# Patient Record
Sex: MALE | Race: WHITE | NOT HISPANIC OR LATINO | Employment: FULL TIME | URBAN - METROPOLITAN AREA
[De-identification: names, ages, dates, MRNs, and addresses within clinical notes are randomized per-mention and may not be internally consistent; named-entity substitution may affect disease eponyms.]

---

## 2023-10-24 ENCOUNTER — APPOINTMENT (OUTPATIENT)
Dept: CT IMAGING | Facility: HOSPITAL | Age: 53
DRG: 345 | End: 2023-10-24
Payer: COMMERCIAL

## 2023-10-24 ENCOUNTER — HOSPITAL ENCOUNTER (INPATIENT)
Facility: HOSPITAL | Age: 53
LOS: 1 days | Discharge: HOME | DRG: 345 | End: 2023-10-26
Attending: EMERGENCY MEDICINE | Admitting: INTERNAL MEDICINE
Payer: COMMERCIAL

## 2023-10-24 DIAGNOSIS — K61.1 PERIRECTAL ABSCESS: Primary | ICD-10-CM

## 2023-10-24 DIAGNOSIS — K61.1 PERI-RECTAL ABSCESS: ICD-10-CM

## 2023-10-24 PROBLEM — Z79.01 CHRONIC ANTICOAGULATION: Status: ACTIVE | Noted: 2023-10-24

## 2023-10-24 PROBLEM — E11.65 TYPE 2 DIABETES MELLITUS WITH HYPERGLYCEMIA, WITHOUT LONG-TERM CURRENT USE OF INSULIN: Status: ACTIVE | Noted: 2023-10-24

## 2023-10-24 PROBLEM — E78.2 MIXED HYPERLIPIDEMIA: Status: ACTIVE | Noted: 2023-10-24

## 2023-10-24 PROBLEM — Z95.810 ICD (IMPLANTABLE CARDIOVERTER-DEFIBRILLATOR) IN PLACE: Status: ACTIVE | Noted: 2023-10-24

## 2023-10-24 PROBLEM — I25.10 CORONARY ARTERY DISEASE INVOLVING NATIVE CORONARY ARTERY OF NATIVE HEART WITHOUT ANGINA PECTORIS: Status: ACTIVE | Noted: 2023-10-24

## 2023-10-24 LAB
ALBUMIN SERPL-MCNC: 4.8 G/DL (ref 3.5–5.2)
ALBUMIN/GLOB SERPL: 1.5 G/DL
ALP SERPL-CCNC: 109 U/L (ref 39–117)
ALT SERPL W P-5'-P-CCNC: 21 U/L (ref 1–41)
ANION GAP SERPL CALCULATED.3IONS-SCNC: 11 MMOL/L (ref 5–15)
AST SERPL-CCNC: 17 U/L (ref 1–40)
BASOPHILS # BLD AUTO: 0.05 10*3/MM3 (ref 0–0.2)
BASOPHILS NFR BLD AUTO: 0.7 % (ref 0–1.5)
BILIRUB SERPL-MCNC: 0.6 MG/DL (ref 0–1.2)
BILIRUB UR QL STRIP: NEGATIVE
BUN SERPL-MCNC: 16 MG/DL (ref 6–20)
BUN/CREAT SERPL: 12.3 (ref 7–25)
CALCIUM SPEC-SCNC: 10.1 MG/DL (ref 8.6–10.5)
CHLORIDE SERPL-SCNC: 100 MMOL/L (ref 98–107)
CLARITY UR: CLEAR
CO2 SERPL-SCNC: 29 MMOL/L (ref 22–29)
COLOR UR: YELLOW
CREAT SERPL-MCNC: 1.3 MG/DL (ref 0.76–1.27)
D-LACTATE SERPL-SCNC: 1.2 MMOL/L (ref 0.5–2)
DEPRECATED RDW RBC AUTO: 37.5 FL (ref 37–54)
EGFRCR SERPLBLD CKD-EPI 2021: 64.9 ML/MIN/1.73
EOSINOPHIL # BLD AUTO: 0.16 10*3/MM3 (ref 0–0.4)
EOSINOPHIL NFR BLD AUTO: 2.1 % (ref 0.3–6.2)
ERYTHROCYTE [DISTWIDTH] IN BLOOD BY AUTOMATED COUNT: 11.9 % (ref 12.3–15.4)
GLOBULIN UR ELPH-MCNC: 3.3 GM/DL
GLUCOSE SERPL-MCNC: 152 MG/DL (ref 65–99)
GLUCOSE UR STRIP-MCNC: ABNORMAL MG/DL
HCT VFR BLD AUTO: 52.1 % (ref 37.5–51)
HGB BLD-MCNC: 17.8 G/DL (ref 13–17.7)
HGB UR QL STRIP.AUTO: NEGATIVE
IMM GRANULOCYTES # BLD AUTO: 0.02 10*3/MM3 (ref 0–0.05)
IMM GRANULOCYTES NFR BLD AUTO: 0.3 % (ref 0–0.5)
KETONES UR QL STRIP: NEGATIVE
LEUKOCYTE ESTERASE UR QL STRIP.AUTO: NEGATIVE
LYMPHOCYTES # BLD AUTO: 1.61 10*3/MM3 (ref 0.7–3.1)
LYMPHOCYTES NFR BLD AUTO: 21.6 % (ref 19.6–45.3)
MCH RBC QN AUTO: 29.3 PG (ref 26.6–33)
MCHC RBC AUTO-ENTMCNC: 34.2 G/DL (ref 31.5–35.7)
MCV RBC AUTO: 85.8 FL (ref 79–97)
MONOCYTES # BLD AUTO: 0.87 10*3/MM3 (ref 0.1–0.9)
MONOCYTES NFR BLD AUTO: 11.7 % (ref 5–12)
NEUTROPHILS NFR BLD AUTO: 4.74 10*3/MM3 (ref 1.7–7)
NEUTROPHILS NFR BLD AUTO: 63.6 % (ref 42.7–76)
NITRITE UR QL STRIP: NEGATIVE
NRBC BLD AUTO-RTO: 0 /100 WBC (ref 0–0.2)
PH UR STRIP.AUTO: 6 [PH] (ref 5–8)
PLATELET # BLD AUTO: 274 10*3/MM3 (ref 140–450)
PMV BLD AUTO: 9.1 FL (ref 6–12)
POTASSIUM SERPL-SCNC: 4.1 MMOL/L (ref 3.5–5.2)
PROT SERPL-MCNC: 8.1 G/DL (ref 6–8.5)
PROT UR QL STRIP: ABNORMAL
RBC # BLD AUTO: 6.07 10*6/MM3 (ref 4.14–5.8)
SODIUM SERPL-SCNC: 140 MMOL/L (ref 136–145)
SP GR UR STRIP: 1.04 (ref 1–1.03)
UROBILINOGEN UR QL STRIP: ABNORMAL
WBC NRBC COR # BLD: 7.45 10*3/MM3 (ref 3.4–10.8)

## 2023-10-24 PROCEDURE — 87040 BLOOD CULTURE FOR BACTERIA: CPT | Performed by: EMERGENCY MEDICINE

## 2023-10-24 PROCEDURE — 80053 COMPREHEN METABOLIC PANEL: CPT | Performed by: EMERGENCY MEDICINE

## 2023-10-24 PROCEDURE — 25810000003 SODIUM CHLORIDE 0.9 % SOLUTION: Performed by: PHYSICIAN ASSISTANT

## 2023-10-24 PROCEDURE — 74176 CT ABD & PELVIS W/O CONTRAST: CPT

## 2023-10-24 PROCEDURE — 83605 ASSAY OF LACTIC ACID: CPT | Performed by: EMERGENCY MEDICINE

## 2023-10-24 PROCEDURE — 25010000002 PIPERACILLIN SOD-TAZOBACTAM PER 1 G: Performed by: EMERGENCY MEDICINE

## 2023-10-24 PROCEDURE — G0378 HOSPITAL OBSERVATION PER HR: HCPCS

## 2023-10-24 PROCEDURE — 36415 COLL VENOUS BLD VENIPUNCTURE: CPT

## 2023-10-24 PROCEDURE — 81003 URINALYSIS AUTO W/O SCOPE: CPT | Performed by: EMERGENCY MEDICINE

## 2023-10-24 PROCEDURE — 99285 EMERGENCY DEPT VISIT HI MDM: CPT

## 2023-10-24 PROCEDURE — 85025 COMPLETE CBC W/AUTO DIFF WBC: CPT | Performed by: EMERGENCY MEDICINE

## 2023-10-24 RX ORDER — NICOTINE POLACRILEX 4 MG
15 LOZENGE BUCCAL
Status: DISCONTINUED | OUTPATIENT
Start: 2023-10-24 | End: 2023-10-26 | Stop reason: HOSPADM

## 2023-10-24 RX ORDER — POLYETHYLENE GLYCOL 3350 17 G/17G
17 POWDER, FOR SOLUTION ORAL DAILY PRN
Status: DISCONTINUED | OUTPATIENT
Start: 2023-10-24 | End: 2023-10-26 | Stop reason: HOSPADM

## 2023-10-24 RX ORDER — METOPROLOL SUCCINATE 25 MG/1
25 TABLET, EXTENDED RELEASE ORAL EVERY 24 HOURS
COMMUNITY
Start: 2023-09-13

## 2023-10-24 RX ORDER — EZETIMIBE 10 MG/1
10 TABLET ORAL DAILY
COMMUNITY
Start: 2023-09-13

## 2023-10-24 RX ORDER — ONDANSETRON 4 MG/1
4 TABLET, FILM COATED ORAL EVERY 6 HOURS PRN
Status: DISCONTINUED | OUTPATIENT
Start: 2023-10-24 | End: 2023-10-26 | Stop reason: HOSPADM

## 2023-10-24 RX ORDER — BUSPIRONE HYDROCHLORIDE 15 MG/1
15 TABLET ORAL 2 TIMES DAILY
COMMUNITY
Start: 2023-10-09

## 2023-10-24 RX ORDER — IBUPROFEN 600 MG/1
1 TABLET ORAL
Status: DISCONTINUED | OUTPATIENT
Start: 2023-10-24 | End: 2023-10-26 | Stop reason: HOSPADM

## 2023-10-24 RX ORDER — SULFAMETHOXAZOLE AND TRIMETHOPRIM 800; 160 MG/1; MG/1
1 TABLET ORAL EVERY 12 HOURS SCHEDULED
COMMUNITY
Start: 2023-10-23 | End: 2023-10-26 | Stop reason: HOSPADM

## 2023-10-24 RX ORDER — ONDANSETRON 2 MG/ML
4 INJECTION INTRAMUSCULAR; INTRAVENOUS EVERY 6 HOURS PRN
Status: DISCONTINUED | OUTPATIENT
Start: 2023-10-24 | End: 2023-10-26 | Stop reason: HOSPADM

## 2023-10-24 RX ORDER — HYDROCODONE BITARTRATE AND ACETAMINOPHEN 5; 325 MG/1; MG/1
1 TABLET ORAL EVERY 4 HOURS PRN
Status: DISCONTINUED | OUTPATIENT
Start: 2023-10-24 | End: 2023-10-25 | Stop reason: ALTCHOICE

## 2023-10-24 RX ORDER — INSULIN LISPRO 100 [IU]/ML
2-9 INJECTION, SOLUTION INTRAVENOUS; SUBCUTANEOUS
Status: DISCONTINUED | OUTPATIENT
Start: 2023-10-25 | End: 2023-10-26 | Stop reason: HOSPADM

## 2023-10-24 RX ORDER — METOPROLOL SUCCINATE 25 MG/1
25 TABLET, EXTENDED RELEASE ORAL EVERY 24 HOURS
Status: DISCONTINUED | OUTPATIENT
Start: 2023-10-25 | End: 2023-10-26 | Stop reason: HOSPADM

## 2023-10-24 RX ORDER — ACETAMINOPHEN 325 MG/1
650 TABLET ORAL EVERY 4 HOURS PRN
Status: DISCONTINUED | OUTPATIENT
Start: 2023-10-24 | End: 2023-10-26 | Stop reason: HOSPADM

## 2023-10-24 RX ORDER — SODIUM CHLORIDE 0.9 % (FLUSH) 0.9 %
10 SYRINGE (ML) INJECTION AS NEEDED
Status: DISCONTINUED | OUTPATIENT
Start: 2023-10-24 | End: 2023-10-26 | Stop reason: HOSPADM

## 2023-10-24 RX ORDER — CHOLECALCIFEROL (VITAMIN D3) 125 MCG
5 CAPSULE ORAL NIGHTLY PRN
Status: DISCONTINUED | OUTPATIENT
Start: 2023-10-24 | End: 2023-10-26 | Stop reason: HOSPADM

## 2023-10-24 RX ORDER — ATORVASTATIN CALCIUM 40 MG/1
80 TABLET, FILM COATED ORAL DAILY
Status: DISCONTINUED | OUTPATIENT
Start: 2023-10-25 | End: 2023-10-26 | Stop reason: HOSPADM

## 2023-10-24 RX ORDER — PANTOPRAZOLE SODIUM 40 MG/1
40 TABLET, DELAYED RELEASE ORAL DAILY
Status: DISCONTINUED | OUTPATIENT
Start: 2023-10-25 | End: 2023-10-26 | Stop reason: HOSPADM

## 2023-10-24 RX ORDER — OXYCODONE AND ACETAMINOPHEN 7.5; 325 MG/1; MG/1
1 TABLET ORAL ONCE
Status: COMPLETED | OUTPATIENT
Start: 2023-10-24 | End: 2023-10-24

## 2023-10-24 RX ORDER — FLUOXETINE HYDROCHLORIDE 20 MG/1
20 CAPSULE ORAL DAILY
Status: DISCONTINUED | OUTPATIENT
Start: 2023-10-25 | End: 2023-10-26 | Stop reason: HOSPADM

## 2023-10-24 RX ORDER — FLUTICASONE PROPIONATE AND SALMETEROL 250; 50 UG/1; UG/1
1 POWDER RESPIRATORY (INHALATION)
COMMUNITY

## 2023-10-24 RX ORDER — BISACODYL 5 MG/1
5 TABLET, DELAYED RELEASE ORAL DAILY PRN
Status: DISCONTINUED | OUTPATIENT
Start: 2023-10-24 | End: 2023-10-26 | Stop reason: HOSPADM

## 2023-10-24 RX ORDER — FLUOXETINE HYDROCHLORIDE 20 MG/1
20 CAPSULE ORAL DAILY
COMMUNITY
Start: 2023-09-13

## 2023-10-24 RX ORDER — ALBUTEROL SULFATE 90 UG/1
1 AEROSOL, METERED RESPIRATORY (INHALATION) SEE ADMIN INSTRUCTIONS
COMMUNITY
Start: 2023-08-09

## 2023-10-24 RX ORDER — METFORMIN HYDROCHLORIDE 750 MG/1
750 TABLET, EXTENDED RELEASE ORAL NIGHTLY
COMMUNITY
Start: 2023-08-14

## 2023-10-24 RX ORDER — SACUBITRIL AND VALSARTAN 97; 103 MG/1; MG/1
1 TABLET, FILM COATED ORAL
COMMUNITY
Start: 2023-10-09

## 2023-10-24 RX ORDER — SPIRONOLACTONE 25 MG/1
25 TABLET ORAL DAILY
Status: DISCONTINUED | OUTPATIENT
Start: 2023-10-25 | End: 2023-10-26 | Stop reason: HOSPADM

## 2023-10-24 RX ORDER — ATORVASTATIN CALCIUM 80 MG/1
80 TABLET, FILM COATED ORAL DAILY
COMMUNITY
Start: 2023-09-13

## 2023-10-24 RX ORDER — AMOXICILLIN 250 MG
2 CAPSULE ORAL 2 TIMES DAILY
Status: DISCONTINUED | OUTPATIENT
Start: 2023-10-25 | End: 2023-10-26 | Stop reason: HOSPADM

## 2023-10-24 RX ORDER — BISACODYL 10 MG
10 SUPPOSITORY, RECTAL RECTAL DAILY PRN
Status: DISCONTINUED | OUTPATIENT
Start: 2023-10-24 | End: 2023-10-26 | Stop reason: HOSPADM

## 2023-10-24 RX ORDER — SODIUM CHLORIDE 0.9 % (FLUSH) 0.9 %
10 SYRINGE (ML) INJECTION EVERY 12 HOURS SCHEDULED
Status: DISCONTINUED | OUTPATIENT
Start: 2023-10-24 | End: 2023-10-26 | Stop reason: HOSPADM

## 2023-10-24 RX ORDER — PANTOPRAZOLE SODIUM 40 MG/1
40 TABLET, DELAYED RELEASE ORAL DAILY
COMMUNITY
Start: 2022-11-22 | End: 2023-11-22

## 2023-10-24 RX ORDER — SPIRONOLACTONE 25 MG/1
25 TABLET ORAL DAILY
COMMUNITY
Start: 2023-09-08 | End: 2024-09-07

## 2023-10-24 RX ORDER — APIXABAN 5 MG/1
5 TABLET, FILM COATED ORAL 2 TIMES DAILY
Status: ON HOLD | COMMUNITY
Start: 2023-09-13 | End: 2023-10-26 | Stop reason: SDUPTHER

## 2023-10-24 RX ORDER — SODIUM CHLORIDE 9 MG/ML
40 INJECTION, SOLUTION INTRAVENOUS AS NEEDED
Status: DISCONTINUED | OUTPATIENT
Start: 2023-10-24 | End: 2023-10-26 | Stop reason: HOSPADM

## 2023-10-24 RX ORDER — DEXTROSE MONOHYDRATE 25 G/50ML
25 INJECTION, SOLUTION INTRAVENOUS
Status: DISCONTINUED | OUTPATIENT
Start: 2023-10-24 | End: 2023-10-26 | Stop reason: HOSPADM

## 2023-10-24 RX ORDER — SODIUM CHLORIDE 9 MG/ML
100 INJECTION, SOLUTION INTRAVENOUS CONTINUOUS
Status: DISCONTINUED | OUTPATIENT
Start: 2023-10-24 | End: 2023-10-25

## 2023-10-24 RX ADMIN — OXYCODONE HYDROCHLORIDE AND ACETAMINOPHEN 1 TABLET: 7.5; 325 TABLET ORAL at 22:39

## 2023-10-24 RX ADMIN — PIPERACILLIN SODIUM AND TAZOBACTAM SODIUM 3.38 G: 3; .375 INJECTION, SOLUTION INTRAVENOUS at 22:39

## 2023-10-24 RX ADMIN — Medication 10 ML: at 23:39

## 2023-10-24 RX ADMIN — SODIUM CHLORIDE 100 ML/HR: 9 INJECTION, SOLUTION INTRAVENOUS at 23:39

## 2023-10-24 NOTE — Clinical Note
Level of Care: Telemetry [5]   Diagnosis: Perirectal abscess [[id number]]   Admitting Physician: FRANNY KANG [[id number]]   Attending Physician: FRANNY KANG [[id number]]

## 2023-10-24 NOTE — ED PROVIDER NOTES
Subjective   History of Present Illness  Mr. Santoyo presents with rectal pain and urinary retention.  He reports a week ago he had a painful bowel movement and noted swelling and felt that he probably had hemorrhoids.  It continued.  He saw his primary care provider recently and was prescribed Bactrim for prostatitis.  He was found to be heme positive as well.  He notes swelling around his anus and increasing pain.  He tells me he has to take a hot shower in order to pass urine.  He called his primary care provider today and they told him to come to the emergency department and recommended CAT scanning.       He has history of cardiomyopathy and has an AICD.  He has coronary artery disease and has had multiple stents.      Review of Systems    Past Medical History:   Diagnosis Date    Asthma     Diabetes mellitus     Hypertension     ICD (implantable cardioverter-defibrillator), dual, in situ     MI (myocardial infarction)     Stenosis involving cardiac device     V-tach        No Known Allergies    Past Surgical History:   Procedure Laterality Date    CORONARY STENT PLACEMENT      Stens    ICD GENERATOR REPLACEMENT         Family History   Problem Relation Age of Onset    No Known Problems Mother     No Known Problems Father        Social History     Socioeconomic History    Marital status:    Tobacco Use    Smoking status: Former     Types: Cigarettes    Smokeless tobacco: Never   Vaping Use    Vaping Use: Never used   Substance and Sexual Activity    Alcohol use: Yes    Drug use: Never           Objective   Physical Exam  Vitals and nursing note reviewed.   Constitutional:       General: He is not in acute distress.     Appearance: Normal appearance.   HENT:      Head: Normocephalic and atraumatic.      Nose: Nose normal. No congestion or rhinorrhea.   Eyes:      General: No scleral icterus.     Conjunctiva/sclera: Conjunctivae normal.   Neck:      Comments: No JVD   Cardiovascular:      Rate and Rhythm:  Normal rate and regular rhythm.      Heart sounds: No murmur heard.     No friction rub.   Pulmonary:      Effort: Pulmonary effort is normal.      Breath sounds: Normal breath sounds. No wheezing or rales.   Abdominal:      General: Bowel sounds are normal.      Palpations: Abdomen is soft.      Tenderness: There is no abdominal tenderness. There is no guarding or rebound.   Genitourinary:     Rectum: Normal.      Comments: There is no visible redness or pointing around the anus or perineum.  He does have tenderness along the left side and there is a small palpable firm area several centimeters deep.  There is no palpable abscess however.  Musculoskeletal:         General: No tenderness.      Cervical back: Normal range of motion and neck supple.      Right lower leg: No edema.      Left lower leg: No edema.   Skin:     General: Skin is warm and dry.      Coloration: Skin is not pale.      Findings: No erythema.   Neurological:      General: No focal deficit present.      Mental Status: He is alert and oriented to person, place, and time.      Motor: No weakness.      Coordination: Coordination normal.   Psychiatric:         Mood and Affect: Mood normal.         Behavior: Behavior normal.         Thought Content: Thought content normal.         Procedures           ED Course  ED Course as of 10/24/23 2328   Tue Oct 24, 2023   2149 I reviewed his CT scan.  I examined his rectum.  He is tender but there is no pointing or obvious abscess which I can drain from the outside.  He is a diabetic, this likely will require surgery or at least a procedure of some kind.  Going to start IV antibiotics and get him admitted to the hospital. [DT]   2150 Paged the colorectal surgeon to discuss. [DT]   2155 Spoke with Dr. Irving.  He agrees with IV antibiotics.  Advises this may or may not require surgery.  We will admit him to the hospital and he will see him in the morning and reassess at that time. [DT]      ED Course User  Index  [DT] Ronnie Nur MD                                           Medical Decision Making  Please see course notes.  I ordered and interpreted labs.  I ordered and interpreted results of CT scan showing perirectal abscess.  I had consultation with colorectal surgery.  I made arrangements for admission, gave IV antibiotics and pain medication.    Problems Addressed:  Perirectal abscess: complicated acute illness or injury    Amount and/or Complexity of Data Reviewed  Labs: ordered. Decision-making details documented in ED Course.  Radiology: ordered. Decision-making details documented in ED Course.    Risk  Prescription drug management.  Decision regarding hospitalization.        Final diagnoses:   Perirectal abscess       ED Disposition  ED Disposition       ED Disposition   Decision to Admit    Condition   --    Comment   Level of Care: Telemetry [5]   Diagnosis: Perirectal abscess [[id number]]   Admitting Physician: FRANNY KANG [[id number]]   Attending Physician: FRANNY KANG [[id number]]                 No follow-up provider specified.       Medication List      No changes were made to your prescriptions during this visit.            Ronnie Nur MD  10/24/23 9357

## 2023-10-25 ENCOUNTER — ANESTHESIA (OUTPATIENT)
Dept: PERIOP | Facility: HOSPITAL | Age: 53
End: 2023-10-25
Payer: COMMERCIAL

## 2023-10-25 ENCOUNTER — ANESTHESIA EVENT (OUTPATIENT)
Dept: PERIOP | Facility: HOSPITAL | Age: 53
End: 2023-10-25
Payer: COMMERCIAL

## 2023-10-25 LAB
ANION GAP SERPL CALCULATED.3IONS-SCNC: 10 MMOL/L (ref 5–15)
BASOPHILS # BLD AUTO: 0.05 10*3/MM3 (ref 0–0.2)
BASOPHILS NFR BLD AUTO: 0.8 % (ref 0–1.5)
BUN SERPL-MCNC: 17 MG/DL (ref 6–20)
BUN/CREAT SERPL: 15.3 (ref 7–25)
CALCIUM SPEC-SCNC: 9.3 MG/DL (ref 8.6–10.5)
CHLORIDE SERPL-SCNC: 100 MMOL/L (ref 98–107)
CO2 SERPL-SCNC: 27 MMOL/L (ref 22–29)
CREAT SERPL-MCNC: 1.11 MG/DL (ref 0.76–1.27)
DEPRECATED RDW RBC AUTO: 37.2 FL (ref 37–54)
EGFRCR SERPLBLD CKD-EPI 2021: 78.4 ML/MIN/1.73
EOSINOPHIL # BLD AUTO: 0.14 10*3/MM3 (ref 0–0.4)
EOSINOPHIL NFR BLD AUTO: 2.1 % (ref 0.3–6.2)
ERYTHROCYTE [DISTWIDTH] IN BLOOD BY AUTOMATED COUNT: 11.9 % (ref 12.3–15.4)
GLUCOSE BLDC GLUCOMTR-MCNC: 118 MG/DL (ref 70–130)
GLUCOSE BLDC GLUCOMTR-MCNC: 120 MG/DL (ref 70–130)
GLUCOSE BLDC GLUCOMTR-MCNC: 142 MG/DL (ref 70–130)
GLUCOSE BLDC GLUCOMTR-MCNC: 94 MG/DL (ref 70–130)
GLUCOSE SERPL-MCNC: 117 MG/DL (ref 65–99)
HBA1C MFR BLD: 7.2 % (ref 4.8–5.6)
HCT VFR BLD AUTO: 46.8 % (ref 37.5–51)
HGB BLD-MCNC: 16 G/DL (ref 13–17.7)
IMM GRANULOCYTES # BLD AUTO: 0.02 10*3/MM3 (ref 0–0.05)
IMM GRANULOCYTES NFR BLD AUTO: 0.3 % (ref 0–0.5)
LYMPHOCYTES # BLD AUTO: 1.59 10*3/MM3 (ref 0.7–3.1)
LYMPHOCYTES NFR BLD AUTO: 23.9 % (ref 19.6–45.3)
MCH RBC QN AUTO: 29.2 PG (ref 26.6–33)
MCHC RBC AUTO-ENTMCNC: 34.2 G/DL (ref 31.5–35.7)
MCV RBC AUTO: 85.4 FL (ref 79–97)
MONOCYTES # BLD AUTO: 0.84 10*3/MM3 (ref 0.1–0.9)
MONOCYTES NFR BLD AUTO: 12.6 % (ref 5–12)
NEUTROPHILS NFR BLD AUTO: 4.02 10*3/MM3 (ref 1.7–7)
NEUTROPHILS NFR BLD AUTO: 60.3 % (ref 42.7–76)
NRBC BLD AUTO-RTO: 0 /100 WBC (ref 0–0.2)
PLATELET # BLD AUTO: 222 10*3/MM3 (ref 140–450)
PMV BLD AUTO: 9.3 FL (ref 6–12)
POTASSIUM SERPL-SCNC: 3.8 MMOL/L (ref 3.5–5.2)
RBC # BLD AUTO: 5.48 10*6/MM3 (ref 4.14–5.8)
SODIUM SERPL-SCNC: 137 MMOL/L (ref 136–145)
WBC NRBC COR # BLD: 6.66 10*3/MM3 (ref 3.4–10.8)

## 2023-10-25 PROCEDURE — 0D9P7ZZ DRAINAGE OF RECTUM, VIA NATURAL OR ARTIFICIAL OPENING: ICD-10-PCS | Performed by: STUDENT IN AN ORGANIZED HEALTH CARE EDUCATION/TRAINING PROGRAM

## 2023-10-25 PROCEDURE — 25010000002 PIPERACILLIN SOD-TAZOBACTAM PER 1 G

## 2023-10-25 PROCEDURE — 25010000002 ENOXAPARIN PER 10 MG: Performed by: STUDENT IN AN ORGANIZED HEALTH CARE EDUCATION/TRAINING PROGRAM

## 2023-10-25 PROCEDURE — 80048 BASIC METABOLIC PNL TOTAL CA: CPT | Performed by: PHYSICIAN ASSISTANT

## 2023-10-25 PROCEDURE — 0DJD8ZZ INSPECTION OF LOWER INTESTINAL TRACT, VIA NATURAL OR ARTIFICIAL OPENING ENDOSCOPIC: ICD-10-PCS | Performed by: STUDENT IN AN ORGANIZED HEALTH CARE EDUCATION/TRAINING PROGRAM

## 2023-10-25 PROCEDURE — 25010000002 MIDAZOLAM PER 1 MG: Performed by: ANESTHESIOLOGY

## 2023-10-25 PROCEDURE — 87076 CULTURE ANAEROBE IDENT EACH: CPT | Performed by: STUDENT IN AN ORGANIZED HEALTH CARE EDUCATION/TRAINING PROGRAM

## 2023-10-25 PROCEDURE — 25810000003 LACTATED RINGERS PER 1000 ML: Performed by: ANESTHESIOLOGY

## 2023-10-25 PROCEDURE — 87186 SC STD MICRODIL/AGAR DIL: CPT | Performed by: STUDENT IN AN ORGANIZED HEALTH CARE EDUCATION/TRAINING PROGRAM

## 2023-10-25 PROCEDURE — 87075 CULTR BACTERIA EXCEPT BLOOD: CPT | Performed by: STUDENT IN AN ORGANIZED HEALTH CARE EDUCATION/TRAINING PROGRAM

## 2023-10-25 PROCEDURE — 25010000002 FENTANYL CITRATE (PF) 50 MCG/ML SOLUTION: Performed by: ANESTHESIOLOGY

## 2023-10-25 PROCEDURE — 82948 REAGENT STRIP/BLOOD GLUCOSE: CPT

## 2023-10-25 PROCEDURE — 83036 HEMOGLOBIN GLYCOSYLATED A1C: CPT | Performed by: PHYSICIAN ASSISTANT

## 2023-10-25 PROCEDURE — 25010000002 ONDANSETRON PER 1 MG: Performed by: STUDENT IN AN ORGANIZED HEALTH CARE EDUCATION/TRAINING PROGRAM

## 2023-10-25 PROCEDURE — 85025 COMPLETE CBC W/AUTO DIFF WBC: CPT | Performed by: PHYSICIAN ASSISTANT

## 2023-10-25 PROCEDURE — 25010000002 ONDANSETRON PER 1 MG: Performed by: ANESTHESIOLOGY

## 2023-10-25 PROCEDURE — 87077 CULTURE AEROBIC IDENTIFY: CPT | Performed by: STUDENT IN AN ORGANIZED HEALTH CARE EDUCATION/TRAINING PROGRAM

## 2023-10-25 PROCEDURE — 87205 SMEAR GRAM STAIN: CPT | Performed by: STUDENT IN AN ORGANIZED HEALTH CARE EDUCATION/TRAINING PROGRAM

## 2023-10-25 PROCEDURE — 25010000002 PIPERACILLIN SOD-TAZOBACTAM PER 1 G: Performed by: STUDENT IN AN ORGANIZED HEALTH CARE EDUCATION/TRAINING PROGRAM

## 2023-10-25 PROCEDURE — 87070 CULTURE OTHR SPECIMN AEROBIC: CPT | Performed by: STUDENT IN AN ORGANIZED HEALTH CARE EDUCATION/TRAINING PROGRAM

## 2023-10-25 PROCEDURE — 87015 SPECIMEN INFECT AGNT CONCNTJ: CPT | Performed by: STUDENT IN AN ORGANIZED HEALTH CARE EDUCATION/TRAINING PROGRAM

## 2023-10-25 PROCEDURE — 25010000002 HYDROMORPHONE PER 4 MG: Performed by: STUDENT IN AN ORGANIZED HEALTH CARE EDUCATION/TRAINING PROGRAM

## 2023-10-25 PROCEDURE — 25010000002 PROPOFOL 10 MG/ML EMULSION: Performed by: ANESTHESIOLOGY

## 2023-10-25 RX ORDER — FENTANYL CITRATE 50 UG/ML
50 INJECTION, SOLUTION INTRAMUSCULAR; INTRAVENOUS
Status: DISCONTINUED | OUTPATIENT
Start: 2023-10-25 | End: 2023-10-25

## 2023-10-25 RX ORDER — OXYCODONE HYDROCHLORIDE 5 MG/1
5 TABLET ORAL EVERY 6 HOURS PRN
Qty: 28 TABLET | Refills: 0 | Status: SHIPPED | OUTPATIENT
Start: 2023-10-25 | End: 2023-11-02

## 2023-10-25 RX ORDER — FAMOTIDINE 20 MG/1
20 TABLET, FILM COATED ORAL ONCE
Status: DISCONTINUED | OUTPATIENT
Start: 2023-10-25 | End: 2023-10-25

## 2023-10-25 RX ORDER — MIDAZOLAM HYDROCHLORIDE 1 MG/ML
1 INJECTION INTRAMUSCULAR; INTRAVENOUS
Status: DISCONTINUED | OUTPATIENT
Start: 2023-10-25 | End: 2023-10-25

## 2023-10-25 RX ORDER — ONDANSETRON 2 MG/ML
INJECTION INTRAMUSCULAR; INTRAVENOUS AS NEEDED
Status: DISCONTINUED | OUTPATIENT
Start: 2023-10-25 | End: 2023-10-25 | Stop reason: SURG

## 2023-10-25 RX ORDER — SODIUM CHLORIDE, SODIUM LACTATE, POTASSIUM CHLORIDE, CALCIUM CHLORIDE 600; 310; 30; 20 MG/100ML; MG/100ML; MG/100ML; MG/100ML
9 INJECTION, SOLUTION INTRAVENOUS CONTINUOUS
Status: DISCONTINUED | OUTPATIENT
Start: 2023-10-25 | End: 2023-10-26 | Stop reason: HOSPADM

## 2023-10-25 RX ORDER — LIDOCAINE HYDROCHLORIDE 10 MG/ML
0.5 INJECTION, SOLUTION EPIDURAL; INFILTRATION; INTRACAUDAL; PERINEURAL ONCE AS NEEDED
Status: DISCONTINUED | OUTPATIENT
Start: 2023-10-25 | End: 2023-10-25

## 2023-10-25 RX ORDER — FAMOTIDINE 10 MG/ML
20 INJECTION, SOLUTION INTRAVENOUS ONCE
Status: DISCONTINUED | OUTPATIENT
Start: 2023-10-25 | End: 2023-10-25

## 2023-10-25 RX ORDER — HYDROMORPHONE HYDROCHLORIDE 1 MG/ML
0.5 INJECTION, SOLUTION INTRAMUSCULAR; INTRAVENOUS; SUBCUTANEOUS
Status: DISCONTINUED | OUTPATIENT
Start: 2023-10-25 | End: 2023-10-25

## 2023-10-25 RX ORDER — SODIUM CHLORIDE, SODIUM LACTATE, POTASSIUM CHLORIDE, CALCIUM CHLORIDE 600; 310; 30; 20 MG/100ML; MG/100ML; MG/100ML; MG/100ML
INJECTION, SOLUTION INTRAVENOUS CONTINUOUS PRN
Status: DISCONTINUED | OUTPATIENT
Start: 2023-10-25 | End: 2023-10-25 | Stop reason: SURG

## 2023-10-25 RX ORDER — MIDAZOLAM HYDROCHLORIDE 1 MG/ML
2 INJECTION INTRAMUSCULAR; INTRAVENOUS ONCE
Status: COMPLETED | OUTPATIENT
Start: 2023-10-25 | End: 2023-10-25

## 2023-10-25 RX ORDER — PROPOFOL 10 MG/ML
VIAL (ML) INTRAVENOUS AS NEEDED
Status: DISCONTINUED | OUTPATIENT
Start: 2023-10-25 | End: 2023-10-25 | Stop reason: SURG

## 2023-10-25 RX ORDER — AMOXICILLIN AND CLAVULANATE POTASSIUM 875; 125 MG/1; MG/1
1 TABLET, FILM COATED ORAL 2 TIMES DAILY
Qty: 10 TABLET | Refills: 0 | Status: SHIPPED | OUTPATIENT
Start: 2023-10-25 | End: 2023-10-31

## 2023-10-25 RX ORDER — DIAZEPAM 5 MG/1
5 TABLET ORAL EVERY 6 HOURS PRN
Status: DISCONTINUED | OUTPATIENT
Start: 2023-10-25 | End: 2023-10-26 | Stop reason: HOSPADM

## 2023-10-25 RX ORDER — LIDOCAINE HYDROCHLORIDE 10 MG/ML
INJECTION, SOLUTION EPIDURAL; INFILTRATION; INTRACAUDAL; PERINEURAL AS NEEDED
Status: DISCONTINUED | OUTPATIENT
Start: 2023-10-25 | End: 2023-10-25 | Stop reason: SURG

## 2023-10-25 RX ORDER — OXYCODONE HYDROCHLORIDE 5 MG/1
5 TABLET ORAL EVERY 6 HOURS PRN
Status: DISCONTINUED | OUTPATIENT
Start: 2023-10-25 | End: 2023-10-26 | Stop reason: HOSPADM

## 2023-10-25 RX ORDER — ACETAMINOPHEN 500 MG
1000 TABLET ORAL 3 TIMES DAILY
Status: DISCONTINUED | OUTPATIENT
Start: 2023-10-25 | End: 2023-10-26 | Stop reason: HOSPADM

## 2023-10-25 RX ORDER — NALOXONE HYDROCHLORIDE 4 MG/.1ML
SPRAY NASAL
Qty: 2 EACH | Refills: 0 | Status: SHIPPED | OUTPATIENT
Start: 2023-10-25

## 2023-10-25 RX ORDER — PHENYLEPHRINE HCL IN 0.9% NACL 1 MG/10 ML
SYRINGE (ML) INTRAVENOUS AS NEEDED
Status: DISCONTINUED | OUTPATIENT
Start: 2023-10-25 | End: 2023-10-25 | Stop reason: SURG

## 2023-10-25 RX ORDER — FENTANYL CITRATE 50 UG/ML
INJECTION, SOLUTION INTRAMUSCULAR; INTRAVENOUS AS NEEDED
Status: DISCONTINUED | OUTPATIENT
Start: 2023-10-25 | End: 2023-10-25 | Stop reason: SDUPTHER

## 2023-10-25 RX ORDER — DIAZEPAM 5 MG/1
5 TABLET ORAL EVERY 8 HOURS PRN
Qty: 21 TABLET | Refills: 0 | Status: SHIPPED | OUTPATIENT
Start: 2023-10-25 | End: 2023-11-02

## 2023-10-25 RX ORDER — SODIUM CHLORIDE 9 MG/ML
40 INJECTION, SOLUTION INTRAVENOUS AS NEEDED
Status: DISCONTINUED | OUTPATIENT
Start: 2023-10-25 | End: 2023-10-25

## 2023-10-25 RX ORDER — HYDROMORPHONE HYDROCHLORIDE 1 MG/ML
0.5 INJECTION, SOLUTION INTRAMUSCULAR; INTRAVENOUS; SUBCUTANEOUS ONCE
Status: COMPLETED | OUTPATIENT
Start: 2023-10-26 | End: 2023-10-25

## 2023-10-25 RX ORDER — ENOXAPARIN SODIUM 100 MG/ML
40 INJECTION SUBCUTANEOUS NIGHTLY
Status: DISCONTINUED | OUTPATIENT
Start: 2023-10-25 | End: 2023-10-26 | Stop reason: HOSPADM

## 2023-10-25 RX ORDER — SODIUM CHLORIDE 0.9 % (FLUSH) 0.9 %
10 SYRINGE (ML) INJECTION EVERY 12 HOURS SCHEDULED
Status: DISCONTINUED | OUTPATIENT
Start: 2023-10-25 | End: 2023-10-25

## 2023-10-25 RX ORDER — PROCHLORPERAZINE EDISYLATE 5 MG/ML
5 INJECTION INTRAMUSCULAR; INTRAVENOUS EVERY 6 HOURS PRN
Status: DISCONTINUED | OUTPATIENT
Start: 2023-10-25 | End: 2023-10-26 | Stop reason: HOSPADM

## 2023-10-25 RX ORDER — SODIUM CHLORIDE 0.9 % (FLUSH) 0.9 %
10 SYRINGE (ML) INJECTION AS NEEDED
Status: DISCONTINUED | OUTPATIENT
Start: 2023-10-25 | End: 2023-10-25

## 2023-10-25 RX ADMIN — MIDAZOLAM HYDROCHLORIDE 2 MG: 1 INJECTION, SOLUTION INTRAMUSCULAR; INTRAVENOUS at 15:55

## 2023-10-25 RX ADMIN — Medication 5 MG: at 20:52

## 2023-10-25 RX ADMIN — HYDROCODONE BITARTRATE AND ACETAMINOPHEN 1 TABLET: 5; 325 TABLET ORAL at 10:34

## 2023-10-25 RX ADMIN — DIAZEPAM 5 MG: 5 TABLET ORAL at 21:48

## 2023-10-25 RX ADMIN — Medication 10 ML: at 09:31

## 2023-10-25 RX ADMIN — ACETAMINOPHEN 650 MG: 325 TABLET, FILM COATED ORAL at 09:35

## 2023-10-25 RX ADMIN — LIDOCAINE HYDROCHLORIDE 50 MG: 10 INJECTION, SOLUTION EPIDURAL; INFILTRATION; INTRACAUDAL; PERINEURAL at 17:17

## 2023-10-25 RX ADMIN — ACETAMINOPHEN 1000 MG: 500 TABLET ORAL at 20:52

## 2023-10-25 RX ADMIN — HYDROCODONE BITARTRATE AND ACETAMINOPHEN 1 TABLET: 5; 325 TABLET ORAL at 01:34

## 2023-10-25 RX ADMIN — Medication 10 ML: at 20:53

## 2023-10-25 RX ADMIN — SENNOSIDES AND DOCUSATE SODIUM 2 TABLET: 8.6; 5 TABLET ORAL at 09:27

## 2023-10-25 RX ADMIN — BUSPIRONE HYDROCHLORIDE 15 MG: 5 TABLET ORAL at 09:30

## 2023-10-25 RX ADMIN — Medication 100 MCG: at 17:18

## 2023-10-25 RX ADMIN — SENNOSIDES AND DOCUSATE SODIUM 2 TABLET: 8.6; 5 TABLET ORAL at 20:52

## 2023-10-25 RX ADMIN — SODIUM CHLORIDE, POTASSIUM CHLORIDE, SODIUM LACTATE AND CALCIUM CHLORIDE 9 ML/HR: 600; 310; 30; 20 INJECTION, SOLUTION INTRAVENOUS at 15:58

## 2023-10-25 RX ADMIN — ACETAMINOPHEN 650 MG: 325 TABLET, FILM COATED ORAL at 13:49

## 2023-10-25 RX ADMIN — PROPOFOL 200 MG: 10 INJECTION, EMULSION INTRAVENOUS at 17:17

## 2023-10-25 RX ADMIN — PANTOPRAZOLE SODIUM 40 MG: 40 TABLET, DELAYED RELEASE ORAL at 09:28

## 2023-10-25 RX ADMIN — HYDROMORPHONE HYDROCHLORIDE 0.5 MG: 1 INJECTION, SOLUTION INTRAMUSCULAR; INTRAVENOUS; SUBCUTANEOUS at 23:32

## 2023-10-25 RX ADMIN — ONDANSETRON 4 MG: 2 INJECTION INTRAMUSCULAR; INTRAVENOUS at 17:42

## 2023-10-25 RX ADMIN — SODIUM CHLORIDE, POTASSIUM CHLORIDE, SODIUM LACTATE AND CALCIUM CHLORIDE: 600; 310; 30; 20 INJECTION, SOLUTION INTRAVENOUS at 17:10

## 2023-10-25 RX ADMIN — FLUOXETINE 20 MG: 20 CAPSULE ORAL at 09:29

## 2023-10-25 RX ADMIN — SACUBITRIL AND VALSARTAN 1 TABLET: 97; 103 TABLET, FILM COATED ORAL at 01:34

## 2023-10-25 RX ADMIN — OXYCODONE HYDROCHLORIDE 5 MG: 5 TABLET ORAL at 19:09

## 2023-10-25 RX ADMIN — PIPERACILLIN SODIUM AND TAZOBACTAM SODIUM 3.38 G: 3; .375 INJECTION, SOLUTION INTRAVENOUS at 04:39

## 2023-10-25 RX ADMIN — BUSPIRONE HYDROCHLORIDE 15 MG: 5 TABLET ORAL at 01:34

## 2023-10-25 RX ADMIN — ONDANSETRON 4 MG: 2 INJECTION INTRAMUSCULAR; INTRAVENOUS at 18:56

## 2023-10-25 RX ADMIN — PIPERACILLIN SODIUM AND TAZOBACTAM SODIUM 3.38 G: 3; .375 INJECTION, SOLUTION INTRAVENOUS at 20:52

## 2023-10-25 RX ADMIN — HYDROCODONE BITARTRATE AND ACETAMINOPHEN 1 TABLET: 5; 325 TABLET ORAL at 06:23

## 2023-10-25 RX ADMIN — BUSPIRONE HYDROCHLORIDE 15 MG: 5 TABLET ORAL at 19:03

## 2023-10-25 RX ADMIN — PIPERACILLIN SODIUM AND TAZOBACTAM SODIUM 3.38 G: 3; .375 INJECTION, SOLUTION INTRAVENOUS at 12:51

## 2023-10-25 RX ADMIN — ENOXAPARIN SODIUM 40 MG: 100 INJECTION SUBCUTANEOUS at 20:52

## 2023-10-25 RX ADMIN — FENTANYL CITRATE 100 MCG: 50 INJECTION, SOLUTION INTRAMUSCULAR; INTRAVENOUS at 17:14

## 2023-10-25 NOTE — PLAN OF CARE
Goal Outcome Evaluation:  Plan of Care Reviewed With: patient      Pt in bed with complaints of N/V. PRN meds given on room air. Wife remain at beside. All medication given a ordered. Safety precautions in place. Call bell within reach.

## 2023-10-25 NOTE — PROGRESS NOTES
Pharmacy consult to dose Zosyn for Darryl Santoyo    Estimated Creatinine Clearance: 86.9 mL/min (A) (by C-G formula based on SCr of 1.3 mg/dL (H)).  116 kg (255 lb)    Plan: Zosyn 3.375 Gm IV Q8H  Diagnosis:  Perianal abscess       Andrey Mg, [facility]  10/24/2023  22:58 EDT

## 2023-10-25 NOTE — ANESTHESIA PREPROCEDURE EVALUATION
Anesthesia Evaluation     Patient summary reviewed and Nursing notes reviewed   NPO Solid Status: > 8 hours  NPO Liquid Status: > 8 hours           Airway   Mallampati: II  TM distance: >3 FB  Neck ROM: full  No difficulty expected  Dental - normal exam     Pulmonary     breath sounds clear to auscultation  Cardiovascular   Exercise tolerance: good (4-7 METS)    Rhythm: regular  Rate: normal    (+) past MI  >12 months, cardiac stents more than 12 months ago       Neuro/Psych  GI/Hepatic/Renal/Endo      Musculoskeletal     Abdominal    Substance History      OB/GYN          Other                    Anesthesia Plan    ASA 3     general     intravenous induction     Anesthetic plan, risks, benefits, and alternatives have been provided, discussed and informed consent has been obtained with: patient.    CODE STATUS:    Code Status (Patient has no pulse and is not breathing): CPR (Attempt to Resuscitate)  Medical Interventions (Patient has pulse or is breathing): Full Support

## 2023-10-25 NOTE — PROGRESS NOTES
Colorectal update note:    Patient underwent anorectal exam under anesthesia, incision and drainage of perirectal abscess, fistulotomy and proctoscopy.  He does have a seton in place which will be removed in clinic.    I have informed him and his wife the expected amount of drainage and some blood on the gauze.  As long as he does well overnight, he is clear for discharge in the morning.    I will call in some postoperative pain medications.  Due to his diabetes, I will send him out on a course of Augmentin for couple more days.    He can restart his Eliquis in 72 hours.    Follow-up with me in 10 to 14 days.

## 2023-10-25 NOTE — CONSULTS
Darryl Santoyo  [id number]  [id number]  1970    Patient Care Team:  Anshul Ma MD as PCP - General (Family Medicine)    Consulting Provider Liudmila SWENSON, ER    Reason for Consult perirectal abscess    Subjective     Patient is a 53 y.o. 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  He denied any obvious fevers or chills but has been having night sweats for the last couple of days.  States this pain waxes and wanes and he has had a growing lump which increases and decreases in size over the last month or 2.  CT scan was obtained noting small amount of air within the anal wall concerning for abscess and I was consulted for further evaluation.  Lactate was normal.    Review of Systems   Pertinent items are noted in HPI, all other systems reviewed and negative. Specifically, there is no F/C/N/V/NSAID abuse, recent abx, new/unusual HA or visual disturbances, CP/SOB. Limb swelling, gait disturbance, new rashes or arthritis.       History  Past Medical History:   Diagnosis Date    Asthma     Diabetes mellitus     Hypertension     ICD (implantable cardioverter-defibrillator), dual, in situ     MI (myocardial infarction)     Stenosis involving cardiac device     V-tach      Past Surgical History:   Procedure Laterality Date    CORONARY STENT PLACEMENT      Stens    ICD GENERATOR REPLACEMENT       Family History   Problem Relation Age of Onset    No Known Problems Mother     No Known Problems Father      Social History     Tobacco Use    Smoking status: Former     Types: Cigarettes    Smokeless tobacco: Never   Vaping Use    Vaping Use: Never used   Substance Use Topics    Alcohol use: Yes    Drug use: Never     Medications Prior to Admission   Medication Sig Dispense Refill Last Dose     "albuterol sulfate  (90 Base) MCG/ACT inhaler Inhale 1 puff See Admin Instructions.       atorvastatin (LIPITOR) 80 MG tablet Take 1 tablet by mouth Daily.   10/24/2023    busPIRone (BUSPAR) 15 MG tablet Take 1 tablet by mouth 2 (Two) Times a Day.   10/24/2023    Eliquis 5 MG tablet tablet Take 1 tablet by mouth 2 (Two) Times a Day.   10/24/2023    empagliflozin (JARDIANCE) 10 MG tablet tablet Take 1 tablet by mouth Daily.   10/24/2023    Entresto  MG tablet Take 1 tablet by mouth.   10/24/2023    ezetimibe (ZETIA) 10 MG tablet Take 1 tablet by mouth Daily.   10/24/2023    FLUoxetine (PROzac) 20 MG capsule Take 1 capsule by mouth Daily.   10/24/2023    Fluticasone-Salmeterol (ADVAIR/WIXELA) 250-50 MCG/ACT DISKUS Inhale 1 puff.   10/24/2023    metFORMIN ER (GLUCOPHAGE-XR) 750 MG 24 hr tablet Take 1 tablet by mouth Every Night.   10/24/2023    metoprolol succinate XL (TOPROL-XL) 25 MG 24 hr tablet Take 1 tablet by mouth Daily.   10/24/2023    pantoprazole (PROTONIX) 40 MG EC tablet Take 1 tablet by mouth Daily.   10/24/2023    spironolactone (ALDACTONE) 25 MG tablet Take 1 tablet by mouth Daily.   10/24/2023    sulfamethoxazole-trimethoprim (BACTRIM DS,SEPTRA DS) 800-160 MG per tablet Take 1 tablet by mouth Every 12 (Twelve) Hours.   10/24/2023     Allergies:  Patient has no known allergies.    Objective     Vital Signs  Blood pressure 107/68, pulse 60, temperature 97.9 °F (36.6 °C), temperature source Oral, resp. rate 20, height 188 cm (74\"), weight 116 kg (255 lb), SpO2 96%.  No intake/output data recorded.    Physical Exam:  General Appearance: Alert and Oriented  Head: normocephalic, atraumatic  Eyes: GARY  Neck: trachea midline  Lungs: nonlabored respirations  Heart: regular rhythm & normal rate    Rectal: NORBERTO was not tolerated.  There is significant tenderness to palpation along the anterior aspect of the anus.  Some erythema on the skin.  Some evidence of drainage per rectum.    Abdomen: Soft, " nontender, nondistended  Skin: no bruising or rash  Neurologic: Cranial Nerves grossly intact   Psych: normal      Results Review:   LABS  Results from last 7 days   Lab Units 10/25/23  0447 10/24/23  1823   WBC 10*3/mm3 6.66 7.45   HEMOGLOBIN g/dL 16.0 17.8*   HEMATOCRIT % 46.8 52.1*   PLATELETS 10*3/mm3 222 274     Results from last 7 days   Lab Units 10/25/23  0447 10/24/23  1823   SODIUM mmol/L 137 140   POTASSIUM mmol/L 3.8 4.1   CHLORIDE mmol/L 100 100   CO2 mmol/L 27.0 29.0   BUN mg/dL 17 16   CREATININE mg/dL 1.11 1.30*   GLUCOSE mg/dL 117* 152*   CALCIUM mg/dL 9.3 10.1       IMAGING  Imaging Results (Last 72 Hours)       Procedure Component Value Units Date/Time    CT Abdomen Pelvis Without Contrast [[id number]] Collected: 10/24/23 1820     Updated: 10/24/23 1837    Narrative:      CT ABDOMEN PELVIS WO CONTRAST    Date of Exam: 10/24/2023 6:10 PM EDT    Indication: Swelling around anus, urinary retention, painful BM.    Comparison: None available.    Technique: Axial CT images were obtained of the abdomen and pelvis without the administration of contrast. Reconstructed coronal and sagittal images were also obtained. Automated exposure control and iterative construction methods were used.      Findings:  The included lower lungs appear clear. The stomach is fluid distended, otherwise normal in appearance. No mass or inflammation seen of the duodenum. Bowel loops elsewhere are normal in caliber. There is diffuse fatty liver change. There are multiple   small gallstones in the dependent portion of the otherwise normal-appearing gallbladder. No biliary ductal dilatation is apparent. Spleen, pancreas, adrenal glands, and kidneys appear within normal limits for non-IV contrast enhanced exam.    There is no evidence of intra-abdominal free air or ascites or adenopathy. In the lower abdomen and pelvis, the terminal ileum, cecum and appendix appear within normal limits. No abnormally dilated or inflamed appearing  bowel loops are seen. Bladder is   moderately distended and normal in appearance.    There is subtle inflammatory change along the anterior margin of the anus from approximately 12:00 to approximately 3:00 as seen on image #156 and adjacent images. A small associated air-fluid level along the anterior margin of the anus may be   intraluminal, but may represent a very small intramural abscess, up to 8 mm in diameter. No potential inflammatory focus is identified elsewhere. Bony structures appear to be intact.    ,      Impression:      Impression:    1. Subtle, focal inflammation along the anterior margin of the anus, with either a small amount of intraluminal fluid and air or perhaps an 8 mm anterior intramural abscess.    2. Fluid distended stomach whether from large recent meal or drink or delayed gastric emptying. Please correlate with patient's symptoms.    3. Cholelithiasis.        Electronically Signed: Les Mcfarlane MD    10/24/2023 6:34 PM EDT    Workstation ID: [id number]             I reviewed the patient's new clinical results.    Assessment & Plan       Perirectal abscess    Type 2 diabetes mellitus with hyperglycemia, without long-term current use of insulin    Coronary artery disease involving native coronary artery of native heart without angina pectoris    ICD (implantable cardioverter-defibrillator) in place    Mixed hyperlipidemia    Chronic anticoagulation      53-year-old male with history of CAD status post stents, ICD placement, history of apical thrombus on Eliquis, hypertension, hyperlipidemia, type 2 diabetes and asthma who presents with a perirectal abscess and difficulty with urination.  No signs of sepsis though or toxicity.    I have a concern for an intersphincteric abscess due to the significant amount of pain that he is in.  He is currently not toxic.  He should undergo surgical drainage soon but does not need to be immediate    Plan  I like to take him for an anorectal exam and anesthesia  with incision and drainage of his perirectal abscess later today  Continue antibiotics in the meantime  N.p.o. except for meds        I discussed the patients findings and my recommendations with patient.     Noe Irving MD  10/25/23  06:52 EDT    Time: More than 50% of time spent in counseling and coordination of care:  Total face-to-face/floor time 25 minutes min.  Time spent in counseling 15 minutes min. Counseling included the following topics: Perirectal abscess, anal fistula in the treatment of such

## 2023-10-25 NOTE — ANESTHESIA PROCEDURE NOTES
Airway  Urgency: elective    Date/Time: 10/25/2023 5:18 PM  End Time:10/25/2023 5:28 PM  Airway not difficult    General Information and Staff    Patient location during procedure: OR  Anesthesiologist: Sergio Tian MD    Indications and Patient Condition  Indications for airway management: airway protection    Preoxygenated: yes  Mask difficulty assessment: 1 - vent by mask    Final Airway Details  Final airway type: supraglottic airway      Successful airway: I-gel  Size 4     Number of attempts at approach: 1  Assessment: lips, teeth, and gum same as pre-op    Additional Comments  LMA placed without difficulty, ventilation with assist, equal breath sounds and symmetric chest rise and fall

## 2023-10-25 NOTE — PAYOR COMM NOTE
"Ref # [id number]  Utilization review fax: [phone]  Jayden Swann (53 y.o. Male)       Date of Birth   1970    Social Security Number       Address   [address] [place] [address]    Home Phone   [phone]    MRN   [id number]       Voodoo   Hindu    Marital Status                               Admission Date   10/24/23    Admission Type   Emergency    Admitting Provider   Terra Boss MD    Attending Provider   Terra Boss MD    Department, Room/Bed   [facility] OR, [facility] OR/MAIN OR       Discharge Date       Discharge Disposition       Discharge Destination                                 Attending Provider: Terra Boss MD    Allergies: No Known Allergies    Isolation: None   Infection: None   Code Status: CPR    Ht: 188 cm (74\")   Wt: 116 kg (255 lb)    Admission Cmt: None   Principal Problem: Perirectal abscess [K61.1]                   Active Insurance as of 10/24/2023       Primary Coverage       Payor Plan Insurance Group Employer/Plan Group    [employer] BLUE [employer]OSS [employer] BLUE [employer]OSS BLUE [employer] PPO [id number]       Payor Plan Address Payor Plan Phone Number Payor Plan Fax Number Effective Dates    [address] [phone]  2021 - None Entered    [place] [address]         Subscriber Name Subscriber Birth Date Member ID       JAYDEN SWANN 1970 [id number]                     Emergency Contacts        (Rel.) Home Phone Work Phone Mobile Phone    MIGUEL SWANN (Spouse) [phone] -- [phone]                 History & Physical        Ok Ferguson PA-C at 10/24/23 2236       Attestation signed by Meng Lantigua MD at 10/25/23 0502    I have reviewed this documentation and agree.                      [facility] Medicine Services  HISTORY AND PHYSICAL    Patient Name: Jayden Swann  : 1970  MRN: [id number]  Primary Care Physician: Anshul Ma MD  Date of admission: " 10/24/2023    Subjective  Subjective     Chief Complaint:  Perineal pain    HPI:  Darryl Santoyo is a 53 y.o. male with a history of CAD s/p multiple stents, hx ICD placement, Hx apical thrombus (on Eliquis), HTN, HLD, T2DM, and asthma who presents to [facility] ED for complaint of perineal pain. He states this has been going on for about a week now. He reports worsening rectal pain with bowel movements, as well as complaint of a feeling of urinary urgency. He states that taking a hot shower seems to alleviate some of his symptoms. He states that he visited his PCP's office 3 days ago for these symptoms, and was initially diagnosed with Prostatitis and was started on Bactrim which he has taken as directed. He reports that his pain has not improved since starting this. He denies fever, chills, chest pain, SOB, cough, abdominal pain, nausea or vomiting.        Review of Systems   Constitutional:  Negative for chills, fatigue, fever and unexpected weight change.   HENT:  Negative for nosebleeds, sinus pain, sore throat and trouble swallowing.    Eyes:  Negative for photophobia and visual disturbance.   Respiratory:  Negative for cough, shortness of breath and wheezing.    Cardiovascular:  Negative for chest pain and palpitations.   Gastrointestinal:  Negative for abdominal pain, diarrhea, nausea and vomiting.   Genitourinary:  Positive for urgency. Negative for dysuria.        Perineal pain   Musculoskeletal:  Negative for arthralgias and myalgias.   Neurological:  Negative for speech difficulty, weakness and numbness.   Psychiatric/Behavioral:  Negative for confusion. The patient is not nervous/anxious.               Personal History     Past Medical History:   Diagnosis Date    Asthma     Diabetes mellitus     Hypertension     ICD (implantable cardioverter-defibrillator), dual, in situ     MI (myocardial infarction)     Stenosis involving cardiac device     V-tach              Past Surgical History:   Procedure  Laterality Date    CORONARY STENT PLACEMENT      Stens    ICD GENERATOR REPLACEMENT         Family History:  family history includes No Known Problems in his father and mother.     Social History:  reports that he has quit smoking. His smoking use included cigarettes. He has never used smokeless tobacco. He reports current alcohol use. He reports that he does not use drugs.  Social History     Social History Narrative    Not on file       Medications:  FLUoxetine, Fluticasone-Salmeterol, albuterol sulfate HFA, apixaban, atorvastatin, busPIRone, empagliflozin, ezetimibe, metFORMIN ER, metoprolol succinate XL, pantoprazole, sacubitril-valsartan, spironolactone, and sulfamethoxazole-trimethoprim    No Known Allergies    Objective  Objective     Vital Signs:   Temp:  [97.9 °F (36.6 °C)-98.1 °F (36.7 °C)] 97.9 °F (36.6 °C)  Heart Rate:  [60-75] 60  Resp:  [18-20] 20  BP: (107-135)/(68-88) 107/68    Physical Exam  Constitutional:       General: He is not in acute distress.     Appearance: Normal appearance.   HENT:      Head: Atraumatic.      Right Ear: External ear normal.      Left Ear: External ear normal.      Nose: Nose normal.   Eyes:      Extraocular Movements: Extraocular movements intact.      Conjunctiva/sclera: Conjunctivae normal.      Pupils: Pupils are equal, round, and reactive to light.   Cardiovascular:      Rate and Rhythm: Normal rate and regular rhythm.      Pulses: Normal pulses.      Heart sounds: Normal heart sounds. No murmur heard.  Pulmonary:      Effort: Pulmonary effort is normal. No respiratory distress.      Breath sounds: Normal breath sounds. No wheezing, rhonchi or rales.   Abdominal:      General: Bowel sounds are normal. There is no distension.      Tenderness: There is no abdominal tenderness. There is no guarding or rebound.   Musculoskeletal:         General: Normal range of motion.      Cervical back: No rigidity.      Right lower leg: No edema.      Left lower leg: No edema.    Skin:     General: Skin is warm and dry.      Coloration: Skin is not jaundiced.      Findings: No lesion or rash.   Neurological:      General: No focal deficit present.      Mental Status: He is alert and oriented to person, place, and time.   Psychiatric:         Attention and Perception: Attention normal.         Mood and Affect: Mood normal.         Behavior: Behavior normal.         Thought Content: Thought content normal.          Result Review:  I have personally reviewed the results from the time of this admission to 10/25/2023 04:49 EDT and agree with these findings:  [x]  Laboratory list / accordion  []  Microbiology  [x]  Radiology  []  EKG/Telemetry   []  Cardiology/Vascular   []  Pathology  [x]  Old records  []  Other:      LAB RESULTS:      Lab 10/24/23  2213 10/24/23  1823   WBC  --  7.45   HEMOGLOBIN  --  17.8*   HEMATOCRIT  --  52.1*   PLATELETS  --  274   NEUTROS ABS  --  4.74   IMMATURE GRANS (ABS)  --  0.02   LYMPHS ABS  --  1.61   MONOS ABS  --  0.87   EOS ABS  --  0.16   MCV  --  85.8   LACTATE 1.2  --          Lab 10/24/23  1823   SODIUM 140   POTASSIUM 4.1   CHLORIDE 100   CO2 29.0   ANION GAP 11.0   BUN 16   CREATININE 1.30*   EGFR 64.9   GLUCOSE 152*   CALCIUM 10.1         Lab 10/24/23  1823   TOTAL PROTEIN 8.1   ALBUMIN 4.8   GLOBULIN 3.3   ALT (SGPT) 21   AST (SGOT) 17   BILIRUBIN 0.6   ALK PHOS 109                     Brief Urine Lab Results  (Last result in the past 365 days)        Color   Clarity   Blood   Leuk Est   Nitrite   Protein   CREAT   Urine HCG        10/24/23 1817 Yellow   Clear   Negative   Negative   Negative   Trace                 Microbiology Results (last 10 days)       ** No results found for the last 240 hours. **            CT Abdomen Pelvis Without Contrast    Result Date: 10/24/2023  CT ABDOMEN PELVIS WO CONTRAST Date of Exam: 10/24/2023 6:10 PM EDT Indication: Swelling around anus, urinary retention, painful BM. Comparison: None available. Technique: Axial CT  images were obtained of the abdomen and pelvis without the administration of contrast. Reconstructed coronal and sagittal images were also obtained. Automated exposure control and iterative construction methods were used. Findings: The included lower lungs appear clear. The stomach is fluid distended, otherwise normal in appearance. No mass or inflammation seen of the duodenum. Bowel loops elsewhere are normal in caliber. There is diffuse fatty liver change. There are multiple small gallstones in the dependent portion of the otherwise normal-appearing gallbladder. No biliary ductal dilatation is apparent. Spleen, pancreas, adrenal glands, and kidneys appear within normal limits for non-IV contrast enhanced exam. There is no evidence of intra-abdominal free air or ascites or adenopathy. In the lower abdomen and pelvis, the terminal ileum, cecum and appendix appear within normal limits. No abnormally dilated or inflamed appearing bowel loops are seen. Bladder is moderately distended and normal in appearance. There is subtle inflammatory change along the anterior margin of the anus from approximately 12:00 to approximately 3:00 as seen on image #156 and adjacent images. A small associated air-fluid level along the anterior margin of the anus may be intraluminal, but may represent a very small intramural abscess, up to 8 mm in diameter. No potential inflammatory focus is identified elsewhere. Bony structures appear to be intact. ,     Impression: Impression: 1. Subtle, focal inflammation along the anterior margin of the anus, with either a small amount of intraluminal fluid and air or perhaps an 8 mm anterior intramural abscess. 2. Fluid distended stomach whether from large recent meal or drink or delayed gastric emptying. Please correlate with patient's symptoms. 3. Cholelithiasis. Electronically Signed: Les Mcfarlane MD  10/24/2023 6:34 PM EDT  Workstation ID: [id number]         Assessment & Plan  Assessment & Plan        Perirectal abscess    Type 2 diabetes mellitus with hyperglycemia, without long-term current use of insulin    Coronary artery disease involving native coronary artery of native heart without angina pectoris    ICD (implantable cardioverter-defibrillator) in place    Mixed hyperlipidemia    Chronic anticoagulation      Darryl Santoyo is a 53 y.o. male with a history of CAD s/p multiple stents, hx ICD placement, Hx apical thrombus (on Eliquis), HTN, HLD, T2DM, and asthma who presents to [facility] ED for complaint of perineal pain.    Perirectal Abscess  -CT abd/pelvis tonight shows focal inflammation along the anterior margin of the anus, with either a small amount of intraluminal fluid and air or perhaps an 8 mm anterior intramural abscess.   -Colorectal Surgery made aware of case, will see in the am. May possibly undergo surgical intervention.   -Start Zosyn tonight.   -Gentle IV fluids  -Hold eliquis for tonight.   -PRN bowel regimen  -NPO after midnight.     CAD s/p stents  HTN  HLD  ICD placement  Hx Apical mural thrombus  -Chest pain free.   -Takes Entresto, Metoprolol, and Spironolactone. Continue  -Continue stain  -Holding Eliquis for now pending possible surgery in the am    T2DM  -Blood glucose 152  -Check A1C  -Fingerstick achs. SSI      Anxiety/depression  -Continue home meds        DVT prophylaxis:  Eliquis    CODE STATUS:  Full Code  Code Status (Patient has no pulse and is not breathing): CPR (Attempt to Resuscitate)  Medical Interventions (Patient has pulse or is breathing): Full Support      Expected DischargeTBD       This note has been completed as part of a split-shared workflow.     Signature: Electronically signed by Ok Ferguson PA-C, 10/24/23, 10:50 PM EDT              Electronically signed by Meng Lantigua MD at 10/25/23 6640          Physician Progress Notes (all)        Terra Boss MD at 10/25/23 3640              [facility]  Services  PROGRESS NOTE    Patient Name: Darryl Santoyo  : 1970  MRN: [id number]    Date of Admission: 10/24/2023  Primary Care Physician: Anshul Ma MD    Subjective   Subjective     CC:  Perirectal abscess    HPI:  Pain today - better after shower + norco, going for drainage this PM  Has had colonoscopies which are normal, utd on these. No h/o IBD. H/o constipation and hemorrhoids      Objective   Objective     Vital Signs:   Temp:  [97.8 °F (36.6 °C)-98.1 °F (36.7 °C)] 97.8 °F (36.6 °C)  Heart Rate:  [60-75] 68  Resp:  [17-20] 18  BP: (102-135)/(68-88) 123/70     Physical Exam:  Constitutional: No acute distress, awake, alert  HENT: NCAT, mucous membranes moist  Respiratory: Clear to auscultation bilaterally, respiratory effort normal   Cardiovascular: RRR, no murmurs, rubs, or gallops  Gastrointestinal: Soft, nontender, nondistended  Musculoskeletal: Muscle tone within normal limits, no joint effusions appreciated  Psychiatric: Appropriate affect, cooperative  Neurologic: Alert and oriented, facial movements symmetric and spontaneous movement of all 4 extremities grossly equal bilaterally, speech clear  Skin: No rashes    Results Reviewed:  LAB RESULTS:      Lab 10/25/23  0447 10/24/23  2213 10/24/23  1823   WBC 6.66  --  7.45   HEMOGLOBIN 16.0  --  17.8*   HEMATOCRIT 46.8  --  52.1*   PLATELETS 222  --  274   NEUTROS ABS 4.02  --  4.74   IMMATURE GRANS (ABS) 0.02  --  0.02   LYMPHS ABS 1.59  --  1.61   MONOS ABS 0.84  --  0.87   EOS ABS 0.14  --  0.16   MCV 85.4  --  85.8   LACTATE  --  1.2  --          Lab 10/25/23  0447 10/24/23  1823   SODIUM 137 140   POTASSIUM 3.8 4.1   CHLORIDE 100 100   CO2 27.0 29.0   ANION GAP 10.0 11.0   BUN 17 16   CREATININE 1.11 1.30*   EGFR 78.4 64.9   GLUCOSE 117* 152*   CALCIUM 9.3 10.1   HEMOGLOBIN A1C 7.20*  --          Lab 10/24/23  1823   TOTAL PROTEIN 8.1   ALBUMIN 4.8   GLOBULIN 3.3   ALT (SGPT) 21   AST (SGOT) 17   BILIRUBIN 0.6   ALK PHOS 109                      Brief Urine Lab Results  (Last result in the past 365 days)        Color   Clarity   Blood   Leuk Est   Nitrite   Protein   CREAT   Urine HCG        10/24/23 1817 Yellow   Clear   Negative   Negative   Negative   Trace                   Microbiology Results Abnormal       None            CT Abdomen Pelvis Without Contrast    Result Date: 10/24/2023  CT ABDOMEN PELVIS WO CONTRAST Date of Exam: 10/24/2023 6:10 PM EDT Indication: Swelling around anus, urinary retention, painful BM. Comparison: None available. Technique: Axial CT images were obtained of the abdomen and pelvis without the administration of contrast. Reconstructed coronal and sagittal images were also obtained. Automated exposure control and iterative construction methods were used. Findings: The included lower lungs appear clear. The stomach is fluid distended, otherwise normal in appearance. No mass or inflammation seen of the duodenum. Bowel loops elsewhere are normal in caliber. There is diffuse fatty liver change. There are multiple small gallstones in the dependent portion of the otherwise normal-appearing gallbladder. No biliary ductal dilatation is apparent. Spleen, pancreas, adrenal glands, and kidneys appear within normal limits for non-IV contrast enhanced exam. There is no evidence of intra-abdominal free air or ascites or adenopathy. In the lower abdomen and pelvis, the terminal ileum, cecum and appendix appear within normal limits. No abnormally dilated or inflamed appearing bowel loops are seen. Bladder is moderately distended and normal in appearance. There is subtle inflammatory change along the anterior margin of the anus from approximately 12:00 to approximately 3:00 as seen on image #156 and adjacent images. A small associated air-fluid level along the anterior margin of the anus may be intraluminal, but may represent a very small intramural abscess, up to 8 mm in diameter. No potential inflammatory focus is identified elsewhere.  Bony structures appear to be intact. ,     Impression: Impression: 1. Subtle, focal inflammation along the anterior margin of the anus, with either a small amount of intraluminal fluid and air or perhaps an 8 mm anterior intramural abscess. 2. Fluid distended stomach whether from large recent meal or drink or delayed gastric emptying. Please correlate with patient's symptoms. 3. Cholelithiasis. Electronically Signed: Les Mcfarlane MD  10/24/2023 6:34 PM EDT  Workstation ID: [id number]         Current medications:  Scheduled Meds:atorvastatin, 80 mg, Oral, Daily  busPIRone, 15 mg, Oral, BID  FLUoxetine, 20 mg, Oral, Daily  [MAR Hold] insulin lispro, 2-9 Units, Subcutaneous, TID With Meals  metoprolol succinate XL, 25 mg, Oral, Q24H  pantoprazole, 40 mg, Oral, Daily  piperacillin-tazobactam, 3.375 g, Intravenous, Q8H  [Held by provider] sacubitril-valsartan, 1 tablet, Oral, Q12H  [MAR Hold] senna-docusate sodium, 2 tablet, Oral, BID  sodium chloride, 10 mL, Intravenous, Q12H  [Held by provider] spironolactone, 25 mg, Oral, Daily      Continuous Infusions:Pharmacy to Dose Zosyn,   sodium chloride, 100 mL/hr, Last Rate: 100 mL/hr (10/24/23 7409)      PRN Meds:.  acetaminophen    [MAR Hold] senna-docusate sodium **AND** [MAR Hold] polyethylene glycol **AND** [MAR Hold] bisacodyl **AND** [MAR Hold] bisacodyl    [MAR Hold] dextrose    [MAR Hold] dextrose    [MAR Hold] glucagon (human recombinant)    [MAR Hold] HYDROcodone-acetaminophen    melatonin    [MAR Hold] ondansetron **OR** [MAR Hold] ondansetron    Pharmacy to Dose Zosyn    sodium chloride    sodium chloride    Assessment & Plan   Assessment & Plan     Active Hospital Problems    Diagnosis  POA    **Perirectal abscess [K61.1]  Yes    Type 2 diabetes mellitus with hyperglycemia, without long-term current use of insulin [E11.65]  Yes    Coronary artery disease involving native coronary artery of native heart without angina pectoris [I25.10]  Yes    ICD (implantable  cardioverter-defibrillator) in place [Z95.810]  Yes    Mixed hyperlipidemia [E78.2]  Yes    Chronic anticoagulation [Z79.01]  Not Applicable      Resolved Hospital Problems   No resolved problems to display.        Brief Hospital Course to date:  Darryl Santoyo is a 53 y.o. male w h/o CAD s/p PCI, VT s/p ICD, CHFrEF (last EF 31%) chronic apical thrombus on eliquis, presenting with persistent perineal pain, found to have perirectal abscess    Acute perirectal abscess  -NPO, drainage today w Dr Maria Fernanda beach for now    Ischemic cardiomyopathy s/p PCI, EF 31%  -metoprolol, statin. Restart entresto + spironolactone post-op  -hold further IVF    H/o VT s/p ICD    Chronic apical mural thrombus  -holding home eliquis currently, but thrombus still present on 2022 ECHO. Goal to restart asap, will d/w surgery hopeful restart in AM    DMII - appears controlled, SSI  BMI 32    Expected Discharge Location and Transportation: home  Expected Discharge 10/26 v 10/27 (Discharge date is tentative pending patient's medical condition and is subject to change)  Expected Discharge Date: 10/26/2023; Expected Discharge Time:      DVT prophylaxis:  Medical and mechanical DVT prophylaxis orders are present.     AM-PAC 6 Clicks Score (PT): 24 (10/25/23 0815)    CODE STATUS:   Code Status and Medical Interventions:   Ordered at: 10/24/23 225     Code Status (Patient has no pulse and is not breathing):    CPR (Attempt to Resuscitate)     Medical Interventions (Patient has pulse or is breathing):    Full Support       Terra Boss MD  10/25/23        Electronically signed by Terra Boss MD at 10/25/23 9082          Consult Notes (all)        Noe Irving MD at 10/25/23 0652        Consult Orders    1. Inpatient Colorectal Surgery Consult [[id number]] ordered by Ok Ferguson PA-C at 10/24/23 2253                 Darryl Santoyo  [id number]  [id number]  1970    Patient Care Team:  Anshul Ma MD as PCP - General  (Family Medicine)    Consulting Provider Liudmila MR, ER    Reason for Consult perirectal abscess    Subjective     Patient is a 53 y.o. 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  He denied any obvious fevers or chills but has been having night sweats for the last couple of days.  States this pain waxes and wanes and he has had a growing lump which increases and decreases in size over the last month or 2.  CT scan was obtained noting small amount of air within the anal wall concerning for abscess and I was consulted for further evaluation.  Lactate was normal.    Review of Systems   Pertinent items are noted in HPI, all other systems reviewed and negative. Specifically, there is no F/C/N/V/NSAID abuse, recent abx, new/unusual HA or visual disturbances, CP/SOB. Limb swelling, gait disturbance, new rashes or arthritis.       History  Past Medical History:   Diagnosis Date    Asthma     Diabetes mellitus     Hypertension     ICD (implantable cardioverter-defibrillator), dual, in situ     MI (myocardial infarction)     Stenosis involving cardiac device     V-tach      Past Surgical History:   Procedure Laterality Date    CORONARY STENT PLACEMENT      Stens    ICD GENERATOR REPLACEMENT       Family History   Problem Relation Age of Onset    No Known Problems Mother     No Known Problems Father      Social History     Tobacco Use    Smoking status: Former     Types: Cigarettes    Smokeless tobacco: Never   Vaping Use    Vaping Use: Never used   Substance Use Topics    Alcohol use: Yes    Drug use: Never     Medications Prior to Admission   Medication Sig Dispense Refill Last Dose    albuterol sulfate  (90 Base) MCG/ACT inhaler Inhale 1 puff See Admin Instructions.       atorvastatin  "(LIPITOR) 80 MG tablet Take 1 tablet by mouth Daily.   10/24/2023    busPIRone (BUSPAR) 15 MG tablet Take 1 tablet by mouth 2 (Two) Times a Day.   10/24/2023    Eliquis 5 MG tablet tablet Take 1 tablet by mouth 2 (Two) Times a Day.   10/24/2023    empagliflozin (JARDIANCE) 10 MG tablet tablet Take 1 tablet by mouth Daily.   10/24/2023    Entresto  MG tablet Take 1 tablet by mouth.   10/24/2023    ezetimibe (ZETIA) 10 MG tablet Take 1 tablet by mouth Daily.   10/24/2023    FLUoxetine (PROzac) 20 MG capsule Take 1 capsule by mouth Daily.   10/24/2023    Fluticasone-Salmeterol (ADVAIR/WIXELA) 250-50 MCG/ACT DISKUS Inhale 1 puff.   10/24/2023    metFORMIN ER (GLUCOPHAGE-XR) 750 MG 24 hr tablet Take 1 tablet by mouth Every Night.   10/24/2023    metoprolol succinate XL (TOPROL-XL) 25 MG 24 hr tablet Take 1 tablet by mouth Daily.   10/24/2023    pantoprazole (PROTONIX) 40 MG EC tablet Take 1 tablet by mouth Daily.   10/24/2023    spironolactone (ALDACTONE) 25 MG tablet Take 1 tablet by mouth Daily.   10/24/2023    sulfamethoxazole-trimethoprim (BACTRIM DS,SEPTRA DS) 800-160 MG per tablet Take 1 tablet by mouth Every 12 (Twelve) Hours.   10/24/2023     Allergies:  Patient has no known allergies.    Objective     Vital Signs  Blood pressure 107/68, pulse 60, temperature 97.9 °F (36.6 °C), temperature source Oral, resp. rate 20, height 188 cm (74\"), weight 116 kg (255 lb), SpO2 96%.  No intake/output data recorded.    Physical Exam:  General Appearance: Alert and Oriented  Head: normocephalic, atraumatic  Eyes: GARY  Neck: trachea midline  Lungs: nonlabored respirations  Heart: regular rhythm & normal rate    Rectal: NORBERTO was not tolerated.  There is significant tenderness to palpation along the anterior aspect of the anus.  Some erythema on the skin.  Some evidence of drainage per rectum.    Abdomen: Soft, nontender, nondistended  Skin: no bruising or rash  Neurologic: Cranial Nerves grossly intact   Psych: " normal      Results Review:   LABS  Results from last 7 days   Lab Units 10/25/23  0447 10/24/23  1823   WBC 10*3/mm3 6.66 7.45   HEMOGLOBIN g/dL 16.0 17.8*   HEMATOCRIT % 46.8 52.1*   PLATELETS 10*3/mm3 222 274     Results from last 7 days   Lab Units 10/25/23  0447 10/24/23  1823   SODIUM mmol/L 137 140   POTASSIUM mmol/L 3.8 4.1   CHLORIDE mmol/L 100 100   CO2 mmol/L 27.0 29.0   BUN mg/dL 17 16   CREATININE mg/dL 1.11 1.30*   GLUCOSE mg/dL 117* 152*   CALCIUM mg/dL 9.3 10.1       IMAGING  Imaging Results (Last 72 Hours)       Procedure Component Value Units Date/Time    CT Abdomen Pelvis Without Contrast [[id number]] Collected: 10/24/23 1820     Updated: 10/24/23 1837    Narrative:      CT ABDOMEN PELVIS WO CONTRAST    Date of Exam: 10/24/2023 6:10 PM EDT    Indication: Swelling around anus, urinary retention, painful BM.    Comparison: None available.    Technique: Axial CT images were obtained of the abdomen and pelvis without the administration of contrast. Reconstructed coronal and sagittal images were also obtained. Automated exposure control and iterative construction methods were used.      Findings:  The included lower lungs appear clear. The stomach is fluid distended, otherwise normal in appearance. No mass or inflammation seen of the duodenum. Bowel loops elsewhere are normal in caliber. There is diffuse fatty liver change. There are multiple   small gallstones in the dependent portion of the otherwise normal-appearing gallbladder. No biliary ductal dilatation is apparent. Spleen, pancreas, adrenal glands, and kidneys appear within normal limits for non-IV contrast enhanced exam.    There is no evidence of intra-abdominal free air or ascites or adenopathy. In the lower abdomen and pelvis, the terminal ileum, cecum and appendix appear within normal limits. No abnormally dilated or inflamed appearing bowel loops are seen. Bladder is   moderately distended and normal in appearance.    There is subtle  inflammatory change along the anterior margin of the anus from approximately 12:00 to approximately 3:00 as seen on image #156 and adjacent images. A small associated air-fluid level along the anterior margin of the anus may be   intraluminal, but may represent a very small intramural abscess, up to 8 mm in diameter. No potential inflammatory focus is identified elsewhere. Bony structures appear to be intact.    ,      Impression:      Impression:    1. Subtle, focal inflammation along the anterior margin of the anus, with either a small amount of intraluminal fluid and air or perhaps an 8 mm anterior intramural abscess.    2. Fluid distended stomach whether from large recent meal or drink or delayed gastric emptying. Please correlate with patient's symptoms.    3. Cholelithiasis.        Electronically Signed: Les Mcfarlane MD    10/24/2023 6:34 PM EDT    Workstation ID: [id number]             I reviewed the patient's new clinical results.    Assessment & Plan       Perirectal abscess    Type 2 diabetes mellitus with hyperglycemia, without long-term current use of insulin    Coronary artery disease involving native coronary artery of native heart without angina pectoris    ICD (implantable cardioverter-defibrillator) in place    Mixed hyperlipidemia    Chronic anticoagulation      53-year-old male with history of CAD status post stents, ICD placement, history of apical thrombus on Eliquis, hypertension, hyperlipidemia, type 2 diabetes and asthma who presents with a perirectal abscess and difficulty with urination.  No signs of sepsis though or toxicity.    I have a concern for an intersphincteric abscess due to the significant amount of pain that he is in.  He is currently not toxic.  He should undergo surgical drainage soon but does not need to be immediate    Plan  I like to take him for an anorectal exam and anesthesia with incision and drainage of his perirectal abscess later today  Continue antibiotics in the  meantime  N.p.o. except for meds        I discussed the patients findings and my recommendations with patient.     Noe Irving MD  10/25/23  06:52 EDT    Time: More than 50% of time spent in counseling and coordination of care:  Total face-to-face/floor time 25 minutes min.  Time spent in counseling 15 minutes min. Counseling included the following topics: Perirectal abscess, anal fistula in the treatment of such    Electronically signed by Noe Irving MD at 10/25/23 0656

## 2023-10-25 NOTE — PROGRESS NOTES
[facility] Medicine Services  PROGRESS NOTE    Patient Name: Darryl Santoyo  : 1970  MRN: [id number]    Date of Admission: 10/24/2023  Primary Care Physician: Anshul Ma MD    Subjective   Subjective     CC:  Perirectal abscess    HPI:  Pain today - better after shower + norco, going for drainage this PM  Has had colonoscopies which are normal, utd on these. No h/o IBD. H/o constipation and hemorrhoids      Objective   Objective     Vital Signs:   Temp:  [97.8 °F (36.6 °C)-98.1 °F (36.7 °C)] 97.8 °F (36.6 °C)  Heart Rate:  [60-75] 68  Resp:  [17-20] 18  BP: (102-135)/(68-88) 123/70     Physical Exam:  Constitutional: No acute distress, awake, alert  HENT: NCAT, mucous membranes moist  Respiratory: Clear to auscultation bilaterally, respiratory effort normal   Cardiovascular: RRR, no murmurs, rubs, or gallops  Gastrointestinal: Soft, nontender, nondistended  Musculoskeletal: Muscle tone within normal limits, no joint effusions appreciated  Psychiatric: Appropriate affect, cooperative  Neurologic: Alert and oriented, facial movements symmetric and spontaneous movement of all 4 extremities grossly equal bilaterally, speech clear  Skin: No rashes    Results Reviewed:  LAB RESULTS:      Lab 10/25/23  0447 10/24/23  2213 10/24/23  1823   WBC 6.66  --  7.45   HEMOGLOBIN 16.0  --  17.8*   HEMATOCRIT 46.8  --  52.1*   PLATELETS 222  --  274   NEUTROS ABS 4.02  --  4.74   IMMATURE GRANS (ABS) 0.02  --  0.02   LYMPHS ABS 1.59  --  1.61   MONOS ABS 0.84  --  0.87   EOS ABS 0.14  --  0.16   MCV 85.4  --  85.8   LACTATE  --  1.2  --          Lab 10/25/23  0447 10/24/23  1823   SODIUM 137 140   POTASSIUM 3.8 4.1   CHLORIDE 100 100   CO2 27.0 29.0   ANION GAP 10.0 11.0   BUN 17 16   CREATININE 1.11 1.30*   EGFR 78.4 64.9   GLUCOSE 117* 152*   CALCIUM 9.3 10.1   HEMOGLOBIN A1C 7.20*  --          Lab 10/24/23  1823   TOTAL PROTEIN 8.1   ALBUMIN 4.8   GLOBULIN 3.3   ALT (SGPT) 21   AST (SGOT) 17    BILIRUBIN 0.6   ALK PHOS 109                     Brief Urine Lab Results  (Last result in the past 365 days)        Color   Clarity   Blood   Leuk Est   Nitrite   Protein   CREAT   Urine HCG        10/24/23 1817 Yellow   Clear   Negative   Negative   Negative   Trace                   Microbiology Results Abnormal       None            CT Abdomen Pelvis Without Contrast    Result Date: 10/24/2023  CT ABDOMEN PELVIS WO CONTRAST Date of Exam: 10/24/2023 6:10 PM EDT Indication: Swelling around anus, urinary retention, painful BM. Comparison: None available. Technique: Axial CT images were obtained of the abdomen and pelvis without the administration of contrast. Reconstructed coronal and sagittal images were also obtained. Automated exposure control and iterative construction methods were used. Findings: The included lower lungs appear clear. The stomach is fluid distended, otherwise normal in appearance. No mass or inflammation seen of the duodenum. Bowel loops elsewhere are normal in caliber. There is diffuse fatty liver change. There are multiple small gallstones in the dependent portion of the otherwise normal-appearing gallbladder. No biliary ductal dilatation is apparent. Spleen, pancreas, adrenal glands, and kidneys appear within normal limits for non-IV contrast enhanced exam. There is no evidence of intra-abdominal free air or ascites or adenopathy. In the lower abdomen and pelvis, the terminal ileum, cecum and appendix appear within normal limits. No abnormally dilated or inflamed appearing bowel loops are seen. Bladder is moderately distended and normal in appearance. There is subtle inflammatory change along the anterior margin of the anus from approximately 12:00 to approximately 3:00 as seen on image #156 and adjacent images. A small associated air-fluid level along the anterior margin of the anus may be intraluminal, but may represent a very small intramural abscess, up to 8 mm in diameter. No  potential inflammatory focus is identified elsewhere. Bony structures appear to be intact. ,     Impression: Impression: 1. Subtle, focal inflammation along the anterior margin of the anus, with either a small amount of intraluminal fluid and air or perhaps an 8 mm anterior intramural abscess. 2. Fluid distended stomach whether from large recent meal or drink or delayed gastric emptying. Please correlate with patient's symptoms. 3. Cholelithiasis. Electronically Signed: Les Mcfarlane MD  10/24/2023 6:34 PM EDT  Workstation ID: [id number]         Current medications:  Scheduled Meds:atorvastatin, 80 mg, Oral, Daily  busPIRone, 15 mg, Oral, BID  FLUoxetine, 20 mg, Oral, Daily  [MAR Hold] insulin lispro, 2-9 Units, Subcutaneous, TID With Meals  metoprolol succinate XL, 25 mg, Oral, Q24H  pantoprazole, 40 mg, Oral, Daily  piperacillin-tazobactam, 3.375 g, Intravenous, Q8H  [Held by provider] sacubitril-valsartan, 1 tablet, Oral, Q12H  [MAR Hold] senna-docusate sodium, 2 tablet, Oral, BID  sodium chloride, 10 mL, Intravenous, Q12H  [Held by provider] spironolactone, 25 mg, Oral, Daily      Continuous Infusions:Pharmacy to Dose Zosyn,   sodium chloride, 100 mL/hr, Last Rate: 100 mL/hr (10/24/23 0510)      PRN Meds:.  acetaminophen    [MAR Hold] senna-docusate sodium **AND** [MAR Hold] polyethylene glycol **AND** [MAR Hold] bisacodyl **AND** [MAR Hold] bisacodyl    [MAR Hold] dextrose    [MAR Hold] dextrose    [MAR Hold] glucagon (human recombinant)    [MAR Hold] HYDROcodone-acetaminophen    melatonin    [MAR Hold] ondansetron **OR** [MAR Hold] ondansetron    Pharmacy to Dose Zosyn    sodium chloride    sodium chloride    Assessment & Plan   Assessment & Plan     Active Hospital Problems    Diagnosis  POA    **Perirectal abscess [K61.1]  Yes    Type 2 diabetes mellitus with hyperglycemia, without long-term current use of insulin [E11.65]  Yes    Coronary artery disease involving native coronary artery of native heart without  angina pectoris [I25.10]  Yes    ICD (implantable cardioverter-defibrillator) in place [Z95.810]  Yes    Mixed hyperlipidemia [E78.2]  Yes    Chronic anticoagulation [Z79.01]  Not Applicable      Resolved Hospital Problems   No resolved problems to display.        Brief Hospital Course to date:  Darryl Santoyo is a 53 y.o. male w h/o CAD s/p PCI, VT s/p ICD, CHFrEF (last EF 31%) chronic apical thrombus on eliquis, presenting with persistent perineal pain, found to have perirectal abscess    Acute perirectal abscess  -NPO, drainage today w Dr Maria Fernanda beach for now    Ischemic cardiomyopathy s/p PCI, EF 31%  -metoprolol, statin. Restart entresto + spironolactone post-op  -hold further IVF    H/o VT s/p ICD    Chronic apical mural thrombus  -holding home eliquis currently, but thrombus still present on 2022 ECHO. Goal to restart asap, will d/w surgery hopeful restart in AM    DMII - appears controlled, SSI  BMI 32    Expected Discharge Location and Transportation: home  Expected Discharge 10/26 v 10/27 (Discharge date is tentative pending patient's medical condition and is subject to change)  Expected Discharge Date: 10/26/2023; Expected Discharge Time:      DVT prophylaxis:  Medical and mechanical DVT prophylaxis orders are present.     AM-PAC 6 Clicks Score (PT): 24 (10/25/23 8445)    CODE STATUS:   Code Status and Medical Interventions:   Ordered at: 10/24/23 8794     Code Status (Patient has no pulse and is not breathing):    CPR (Attempt to Resuscitate)     Medical Interventions (Patient has pulse or is breathing):    Full Support       Terra Boss MD  10/25/23

## 2023-10-25 NOTE — OP NOTE
[facility]  OPERATIVE REPORT      Darryl Santoyo  10/25/2023    Pre-op Diagnosis:   Perirectal abscess      Post-op Diagnosis:    Anterior intersphincteric perirectal abscess    Procedure(s) Performed:  Anorectal exam under anesthesia  Incision and drainage perirectal abscess  Fistulotomy  Proctoscopy    Surgeon(s):  Noe Irving MD    Anesthesia: Monitored Anesthesia Care    Staff:   Circulator: Gracia Boss RN  Scrub Person: DavidIsidro  \       Estimated Blood Loss:  25 mL    Urine Voided: * No values recorded between 10/25/2023  5:11 PM and 10/25/2023  5:49 PM *    Specimens:                Specimens       ID Source Type Tests Collected By Collected At Frozen?    1 Perirectal Abscess Wound ANAEROBIC CULTURE  WOUND CULTURE   Noe Irving MD 10/25/23 3182     Description: brenton rectal abscess                  Drains: Single seton was placed through the perineal body and into the left anterior ischial rectal fossa    Findings: Evidence of a distal intersphincteric abscess which was drained through the anus, there was minimal muscle distal to this and fistulotomy was performed.  There was some extension subcutaneously into the left ischial rectal fossa, a counterincision was made here and a seton placed from the perineal body to left ischial rectal fossa in order to facilitate drainage.    Complications: None    Procedure in Detail: After informed written consent was obtained, the patient was brought to the operating theater, timeout was performed with all parties in agreement.  General anesthesia was introduced, he was placed in lithotomy position with candycane stirrups and the buttock and perineum were prepped and draped in usual sterile fashion.    After started by performing a digital rectal exam followed by insertion of a Alex retractor which which so obvious purulence coming anteriorly.  Crypt hook was easily placed here in the anterior midline at  the level of the dentate line and advanced into the intersphincteric space up to the level of the skin.  This involved a very small portion of muscle and there is healthy proximal anterior sphincter and therefore I created a incision on the skin extended this into the anus and divided a small portion of muscle fibers thus performed a fistulotomy which completely opened up the intersphincteric abscess.  There was a small extension subcutaneously into the left anterior ischial rectal fossa a counterincision was made here through the skin and a seton was placed secured with 2-0 silk.  Proctoscopy was then performed noting no signs of proctitis.  Hemostasis was verified electrocautery and sterile dressings applied.    Patient tolerated the procedure well, was subsequently waken from anesthesia and transferred to the recovery room.  I immediately went and spoke with the patient's family after the procedure was complete to let them know the findings.      Noe Irving MD     Date: 10/25/2023  Time: 18:01 EDT

## 2023-10-25 NOTE — H&P
[facility] Medicine Services  HISTORY AND PHYSICAL    Patient Name: Darryl Santoyo  : 1970  MRN: [id number]  Primary Care Physician: Anshul Ma MD  Date of admission: 10/24/2023    Subjective   Subjective     Chief Complaint:  Perineal pain    HPI:  Darryl Santoyo is a 53 y.o. male with a history of CAD s/p multiple stents, hx ICD placement, Hx apical thrombus (on Eliquis), HTN, HLD, T2DM, and asthma who presents to [facility] ED for complaint of perineal pain. He states this has been going on for about a week now. He reports worsening rectal pain with bowel movements, as well as complaint of a feeling of urinary urgency. He states that taking a hot shower seems to alleviate some of his symptoms. He states that he visited his PCP's office 3 days ago for these symptoms, and was initially diagnosed with Prostatitis and was started on Bactrim which he has taken as directed. He reports that his pain has not improved since starting this. He denies fever, chills, chest pain, SOB, cough, abdominal pain, nausea or vomiting.        Review of Systems   Constitutional:  Negative for chills, fatigue, fever and unexpected weight change.   HENT:  Negative for nosebleeds, sinus pain, sore throat and trouble swallowing.    Eyes:  Negative for photophobia and visual disturbance.   Respiratory:  Negative for cough, shortness of breath and wheezing.    Cardiovascular:  Negative for chest pain and palpitations.   Gastrointestinal:  Negative for abdominal pain, diarrhea, nausea and vomiting.   Genitourinary:  Positive for urgency. Negative for dysuria.        Perineal pain   Musculoskeletal:  Negative for arthralgias and myalgias.   Neurological:  Negative for speech difficulty, weakness and numbness.   Psychiatric/Behavioral:  Negative for confusion. The patient is not nervous/anxious.               Personal History     Past Medical History:   Diagnosis Date    Asthma     Diabetes mellitus      Hypertension     ICD (implantable cardioverter-defibrillator), dual, in situ     MI (myocardial infarction)     Stenosis involving cardiac device     V-tach              Past Surgical History:   Procedure Laterality Date    CORONARY STENT PLACEMENT      Stens    ICD GENERATOR REPLACEMENT         Family History:  family history includes No Known Problems in his father and mother.     Social History:  reports that he has quit smoking. His smoking use included cigarettes. He has never used smokeless tobacco. He reports current alcohol use. He reports that he does not use drugs.  Social History     Social History Narrative    Not on file       Medications:  FLUoxetine, Fluticasone-Salmeterol, albuterol sulfate HFA, apixaban, atorvastatin, busPIRone, empagliflozin, ezetimibe, metFORMIN ER, metoprolol succinate XL, pantoprazole, sacubitril-valsartan, spironolactone, and sulfamethoxazole-trimethoprim    No Known Allergies    Objective   Objective     Vital Signs:   Temp:  [97.9 °F (36.6 °C)-98.1 °F (36.7 °C)] 97.9 °F (36.6 °C)  Heart Rate:  [60-75] 60  Resp:  [18-20] 20  BP: (107-135)/(68-88) 107/68    Physical Exam  Constitutional:       General: He is not in acute distress.     Appearance: Normal appearance.   HENT:      Head: Atraumatic.      Right Ear: External ear normal.      Left Ear: External ear normal.      Nose: Nose normal.   Eyes:      Extraocular Movements: Extraocular movements intact.      Conjunctiva/sclera: Conjunctivae normal.      Pupils: Pupils are equal, round, and reactive to light.   Cardiovascular:      Rate and Rhythm: Normal rate and regular rhythm.      Pulses: Normal pulses.      Heart sounds: Normal heart sounds. No murmur heard.  Pulmonary:      Effort: Pulmonary effort is normal. No respiratory distress.      Breath sounds: Normal breath sounds. No wheezing, rhonchi or rales.   Abdominal:      General: Bowel sounds are normal. There is no distension.      Tenderness: There is no abdominal  tenderness. There is no guarding or rebound.   Musculoskeletal:         General: Normal range of motion.      Cervical back: No rigidity.      Right lower leg: No edema.      Left lower leg: No edema.   Skin:     General: Skin is warm and dry.      Coloration: Skin is not jaundiced.      Findings: No lesion or rash.   Neurological:      General: No focal deficit present.      Mental Status: He is alert and oriented to person, place, and time.   Psychiatric:         Attention and Perception: Attention normal.         Mood and Affect: Mood normal.         Behavior: Behavior normal.         Thought Content: Thought content normal.          Result Review:  I have personally reviewed the results from the time of this admission to 10/25/2023 04:49 EDT and agree with these findings:  [x]  Laboratory list / accordion  []  Microbiology  [x]  Radiology  []  EKG/Telemetry   []  Cardiology/Vascular   []  Pathology  [x]  Old records  []  Other:      LAB RESULTS:      Lab 10/24/23  2213 10/24/23  1823   WBC  --  7.45   HEMOGLOBIN  --  17.8*   HEMATOCRIT  --  52.1*   PLATELETS  --  274   NEUTROS ABS  --  4.74   IMMATURE GRANS (ABS)  --  0.02   LYMPHS ABS  --  1.61   MONOS ABS  --  0.87   EOS ABS  --  0.16   MCV  --  85.8   LACTATE 1.2  --          Lab 10/24/23  1823   SODIUM 140   POTASSIUM 4.1   CHLORIDE 100   CO2 29.0   ANION GAP 11.0   BUN 16   CREATININE 1.30*   EGFR 64.9   GLUCOSE 152*   CALCIUM 10.1         Lab 10/24/23  1823   TOTAL PROTEIN 8.1   ALBUMIN 4.8   GLOBULIN 3.3   ALT (SGPT) 21   AST (SGOT) 17   BILIRUBIN 0.6   ALK PHOS 109                     Brief Urine Lab Results  (Last result in the past 365 days)        Color   Clarity   Blood   Leuk Est   Nitrite   Protein   CREAT   Urine HCG        10/24/23 1817 Yellow   Clear   Negative   Negative   Negative   Trace                 Microbiology Results (last 10 days)       ** No results found for the last 240 hours. **            CT Abdomen Pelvis Without  Contrast    Result Date: 10/24/2023  CT ABDOMEN PELVIS WO CONTRAST Date of Exam: 10/24/2023 6:10 PM EDT Indication: Swelling around anus, urinary retention, painful BM. Comparison: None available. Technique: Axial CT images were obtained of the abdomen and pelvis without the administration of contrast. Reconstructed coronal and sagittal images were also obtained. Automated exposure control and iterative construction methods were used. Findings: The included lower lungs appear clear. The stomach is fluid distended, otherwise normal in appearance. No mass or inflammation seen of the duodenum. Bowel loops elsewhere are normal in caliber. There is diffuse fatty liver change. There are multiple small gallstones in the dependent portion of the otherwise normal-appearing gallbladder. No biliary ductal dilatation is apparent. Spleen, pancreas, adrenal glands, and kidneys appear within normal limits for non-IV contrast enhanced exam. There is no evidence of intra-abdominal free air or ascites or adenopathy. In the lower abdomen and pelvis, the terminal ileum, cecum and appendix appear within normal limits. No abnormally dilated or inflamed appearing bowel loops are seen. Bladder is moderately distended and normal in appearance. There is subtle inflammatory change along the anterior margin of the anus from approximately 12:00 to approximately 3:00 as seen on image #156 and adjacent images. A small associated air-fluid level along the anterior margin of the anus may be intraluminal, but may represent a very small intramural abscess, up to 8 mm in diameter. No potential inflammatory focus is identified elsewhere. Bony structures appear to be intact. ,     Impression: Impression: 1. Subtle, focal inflammation along the anterior margin of the anus, with either a small amount of intraluminal fluid and air or perhaps an 8 mm anterior intramural abscess. 2. Fluid distended stomach whether from large recent meal or drink or delayed  gastric emptying. Please correlate with patient's symptoms. 3. Cholelithiasis. Electronically Signed: Les Mcfarlane MD  10/24/2023 6:34 PM EDT  Workstation ID: [id number]         Assessment & Plan   Assessment & Plan       Perirectal abscess    Type 2 diabetes mellitus with hyperglycemia, without long-term current use of insulin    Coronary artery disease involving native coronary artery of native heart without angina pectoris    ICD (implantable cardioverter-defibrillator) in place    Mixed hyperlipidemia    Chronic anticoagulation      Darryl Santoyo is a 53 y.o. male with a history of CAD s/p multiple stents, hx ICD placement, Hx apical thrombus (on Eliquis), HTN, HLD, T2DM, and asthma who presents to [facility] ED for complaint of perineal pain.    Perirectal Abscess  -CT abd/pelvis tonight shows focal inflammation along the anterior margin of the anus, with either a small amount of intraluminal fluid and air or perhaps an 8 mm anterior intramural abscess.   -Colorectal Surgery made aware of case, will see in the am. May possibly undergo surgical intervention.   -Start Zosyn tonight.   -Gentle IV fluids  -Hold eliquis for tonight.   -PRN bowel regimen  -NPO after midnight.     CAD s/p stents  HTN  HLD  ICD placement  Hx Apical mural thrombus  -Chest pain free.   -Takes Entresto, Metoprolol, and Spironolactone. Continue  -Continue stain  -Holding Eliquis for now pending possible surgery in the am    T2DM  -Blood glucose 152  -Check A1C  -Fingerstick achs. SSI      Anxiety/depression  -Continue home meds        DVT prophylaxis:  Eliquis    CODE STATUS:  Full Code  Code Status (Patient has no pulse and is not breathing): CPR (Attempt to Resuscitate)  Medical Interventions (Patient has pulse or is breathing): Full Support      Expected DischargeTBD       This note has been completed as part of a split-shared workflow.     Signature: Electronically signed by Ok Ferguson PA-C, 10/24/23, 10:50 PM EDT

## 2023-10-25 NOTE — ANESTHESIA POSTPROCEDURE EVALUATION
Patient: Darryl Santoyo    Procedure Summary       Date: 10/25/23 Room / Location:  [facility] /  [facility]    Anesthesia Start: 1710 Anesthesia Stop: 1754    Procedure: ANORECTAL EXAM UNDER ANESTHESIA INCISION AND DRAINAGE OF PERIRECTAL ABSCESS, fistuoloctomy (Rectum) Diagnosis:     Surgeons: Noe Irving MD Provider: Sergio Tian MD    Anesthesia Type: general ASA Status: 3            Anesthesia Type: general    Vitals  Vitals Value Taken Time   /78 10/25/23 1752   Temp     Pulse 71 10/25/23 1753   Resp     SpO2 100 % 10/25/23 1753   Vitals shown include unfiled device data.        Post Anesthesia Care and Evaluation    Patient location during evaluation: PACU  Patient participation: complete - patient participated  Level of consciousness: lethargic  Pain score: 4  Pain management: adequate    Airway patency: patent  Anesthetic complications: No anesthetic complications  PONV Status: none  Cardiovascular status: acceptable  Respiratory status: acceptable  Hydration status: acceptable

## 2023-10-25 NOTE — CASE MANAGEMENT/SOCIAL WORK
Continued Stay Note  [facility]     Patient Name: Darryl Santoyo  MRN: [id number]  Today's Date: 10/25/2023    Admit Date: 10/24/2023    Plan: Home at DC   Discharge Plan       Row Name 10/25/23 1212       Plan    Plan Home at DC    Plan Comments No DC needs identified at this time.    Final Discharge Disposition Code 01 - home or self-care                   Discharge Codes    No documentation.                 Expected Discharge Date and Time       Expected Discharge Date Expected Discharge Time    Oct 26, 2023               Lae Aguilar RN

## 2023-10-26 VITALS
WEIGHT: 255 LBS | BODY MASS INDEX: 32.73 KG/M2 | DIASTOLIC BLOOD PRESSURE: 69 MMHG | OXYGEN SATURATION: 92 % | HEIGHT: 74 IN | HEART RATE: 71 BPM | SYSTOLIC BLOOD PRESSURE: 109 MMHG | RESPIRATION RATE: 16 BRPM | TEMPERATURE: 98.1 F

## 2023-10-26 LAB
ANION GAP SERPL CALCULATED.3IONS-SCNC: 11 MMOL/L (ref 5–15)
BASOPHILS # BLD AUTO: 0.03 10*3/MM3 (ref 0–0.2)
BASOPHILS NFR BLD AUTO: 0.5 % (ref 0–1.5)
BUN SERPL-MCNC: 15 MG/DL (ref 6–20)
BUN/CREAT SERPL: 16.3 (ref 7–25)
CALCIUM SPEC-SCNC: 9.2 MG/DL (ref 8.6–10.5)
CHLORIDE SERPL-SCNC: 102 MMOL/L (ref 98–107)
CO2 SERPL-SCNC: 26 MMOL/L (ref 22–29)
CREAT SERPL-MCNC: 0.92 MG/DL (ref 0.76–1.27)
DEPRECATED RDW RBC AUTO: 39.2 FL (ref 37–54)
EGFRCR SERPLBLD CKD-EPI 2021: 98.2 ML/MIN/1.73
EOSINOPHIL # BLD AUTO: 0.04 10*3/MM3 (ref 0–0.4)
EOSINOPHIL NFR BLD AUTO: 0.6 % (ref 0.3–6.2)
ERYTHROCYTE [DISTWIDTH] IN BLOOD BY AUTOMATED COUNT: 12.2 % (ref 12.3–15.4)
GLUCOSE BLDC GLUCOMTR-MCNC: 152 MG/DL (ref 70–130)
GLUCOSE BLDC GLUCOMTR-MCNC: 99 MG/DL (ref 70–130)
GLUCOSE SERPL-MCNC: 106 MG/DL (ref 65–99)
HCT VFR BLD AUTO: 45.1 % (ref 37.5–51)
HGB BLD-MCNC: 15.2 G/DL (ref 13–17.7)
IMM GRANULOCYTES # BLD AUTO: 0.02 10*3/MM3 (ref 0–0.05)
IMM GRANULOCYTES NFR BLD AUTO: 0.3 % (ref 0–0.5)
LYMPHOCYTES # BLD AUTO: 0.81 10*3/MM3 (ref 0.7–3.1)
LYMPHOCYTES NFR BLD AUTO: 12.4 % (ref 19.6–45.3)
MCH RBC QN AUTO: 29.5 PG (ref 26.6–33)
MCHC RBC AUTO-ENTMCNC: 33.7 G/DL (ref 31.5–35.7)
MCV RBC AUTO: 87.6 FL (ref 79–97)
MONOCYTES # BLD AUTO: 0.77 10*3/MM3 (ref 0.1–0.9)
MONOCYTES NFR BLD AUTO: 11.8 % (ref 5–12)
NEUTROPHILS NFR BLD AUTO: 4.85 10*3/MM3 (ref 1.7–7)
NEUTROPHILS NFR BLD AUTO: 74.4 % (ref 42.7–76)
NRBC BLD AUTO-RTO: 0 /100 WBC (ref 0–0.2)
PLATELET # BLD AUTO: 207 10*3/MM3 (ref 140–450)
PMV BLD AUTO: 9.6 FL (ref 6–12)
POTASSIUM SERPL-SCNC: 4.1 MMOL/L (ref 3.5–5.2)
RBC # BLD AUTO: 5.15 10*6/MM3 (ref 4.14–5.8)
SODIUM SERPL-SCNC: 139 MMOL/L (ref 136–145)
WBC NRBC COR # BLD: 6.52 10*3/MM3 (ref 3.4–10.8)

## 2023-10-26 PROCEDURE — 25010000002 PIPERACILLIN SOD-TAZOBACTAM PER 1 G: Performed by: STUDENT IN AN ORGANIZED HEALTH CARE EDUCATION/TRAINING PROGRAM

## 2023-10-26 PROCEDURE — 82948 REAGENT STRIP/BLOOD GLUCOSE: CPT

## 2023-10-26 PROCEDURE — 80048 BASIC METABOLIC PNL TOTAL CA: CPT | Performed by: STUDENT IN AN ORGANIZED HEALTH CARE EDUCATION/TRAINING PROGRAM

## 2023-10-26 PROCEDURE — 85025 COMPLETE CBC W/AUTO DIFF WBC: CPT | Performed by: STUDENT IN AN ORGANIZED HEALTH CARE EDUCATION/TRAINING PROGRAM

## 2023-10-26 RX ORDER — TAMSULOSIN HYDROCHLORIDE 0.4 MG/1
0.4 CAPSULE ORAL DAILY
Qty: 30 CAPSULE | Refills: 0 | Status: SHIPPED | OUTPATIENT
Start: 2023-10-26 | End: 2023-10-26 | Stop reason: HOSPADM

## 2023-10-26 RX ORDER — APIXABAN 5 MG/1
5 TABLET, FILM COATED ORAL 2 TIMES DAILY
Start: 2023-10-26

## 2023-10-26 RX ORDER — TAMSULOSIN HYDROCHLORIDE 0.4 MG/1
0.4 CAPSULE ORAL DAILY
Status: DISCONTINUED | OUTPATIENT
Start: 2023-10-26 | End: 2023-10-26 | Stop reason: HOSPADM

## 2023-10-26 RX ADMIN — BUSPIRONE HYDROCHLORIDE 15 MG: 5 TABLET ORAL at 09:48

## 2023-10-26 RX ADMIN — FLUOXETINE 20 MG: 20 CAPSULE ORAL at 09:49

## 2023-10-26 RX ADMIN — PIPERACILLIN SODIUM AND TAZOBACTAM SODIUM 3.38 G: 3; .375 INJECTION, SOLUTION INTRAVENOUS at 12:01

## 2023-10-26 RX ADMIN — PIPERACILLIN SODIUM AND TAZOBACTAM SODIUM 3.38 G: 3; .375 INJECTION, SOLUTION INTRAVENOUS at 04:33

## 2023-10-26 RX ADMIN — SENNOSIDES AND DOCUSATE SODIUM 2 TABLET: 8.6; 5 TABLET ORAL at 09:48

## 2023-10-26 RX ADMIN — OXYCODONE HYDROCHLORIDE 5 MG: 5 TABLET ORAL at 06:33

## 2023-10-26 RX ADMIN — Medication 10 ML: at 12:00

## 2023-10-26 RX ADMIN — ACETAMINOPHEN 650 MG: 325 TABLET, FILM COATED ORAL at 05:30

## 2023-10-26 RX ADMIN — OXYCODONE HYDROCHLORIDE 5 MG: 5 TABLET ORAL at 01:04

## 2023-10-26 RX ADMIN — DIAZEPAM 5 MG: 5 TABLET ORAL at 09:49

## 2023-10-26 RX ADMIN — OXYCODONE HYDROCHLORIDE 5 MG: 5 TABLET ORAL at 12:18

## 2023-10-26 RX ADMIN — ACETAMINOPHEN 650 MG: 325 TABLET, FILM COATED ORAL at 01:48

## 2023-10-26 RX ADMIN — PANTOPRAZOLE SODIUM 40 MG: 40 TABLET, DELAYED RELEASE ORAL at 09:48

## 2023-10-26 NOTE — NURSING NOTE
Patient continues to endorses difficulty and strain to void, and is retaining urine. Hydration encouraged, and all I&Os, and post void residuals documented accurately over night.  ANNALEE Cordova notified.,no new orders currently. Will continue to monitor.

## 2023-10-26 NOTE — DISCHARGE SUMMARY
[facility] Medicine Services  DISCHARGE SUMMARY    Patient Name: Darryl Santoyo  : 1970  MRN: [id number]    Date of Admission: 10/24/2023  7:54 PM  Date of Discharge:  10/26  Primary Care Physician: Anshul Ma MD    Consults       Date and Time Order Name Status Description    10/24/2023 11:14 PM Inpatient Colorectal Surgery Consult Completed             Hospital Course     Presenting Problem: perirectal abscess    Active Hospital Problems    Diagnosis  POA    **Perirectal abscess [K61.1]  Yes    Type 2 diabetes mellitus with hyperglycemia, without long-term current use of insulin [E11.65]  Yes    Coronary artery disease involving native coronary artery of native heart without angina pectoris [I25.10]  Yes    ICD (implantable cardioverter-defibrillator) in place [Z95.810]  Yes    Mixed hyperlipidemia [E78.2]  Yes    Chronic anticoagulation [Z79.01]  Not Applicable      Resolved Hospital Problems   No resolved problems to display.          Hospital Course:  Darryl Santoyo is a 53 y.o. male  w h/o CAD s/p PCI, VT s/p ICD, CHFrEF (last EF 31%) chronic apical thrombus on eliquis, presenting with persistent perineal pain, found to have perirectal abscess.  Patient was taken for exam under anesthesia w I&D, fistulotomy and proctoscopy w Dr Irving 10/25. Seton placed, will openly drain  Discharged with pain medications and augmentin per colorectal surgery with follow-up plan. Re-initiate home eliquis 10/28 per surgical orders  Patient with post void residual ~100 immediately post-op as expected in setting of constipation + post op pain. Encouraged on bowel regimen, pain med scripts as above    Discharge Follow Up Recommendations for outpatient labs/diagnostics:   F/u Dr Irving 10-12 days   Restart eliquis 10/28    Day of Discharge     HPI:   Ready to get home - feels he will be more comfortable there  Feels ready for bowel movement    Vital Signs:   Temp:  [97.7 °F (36.5 °C)-98.1  °F (36.7 °C)] 98.1 °F (36.7 °C)  Heart Rate:  [66-85] 71  Resp:  [16] 16  BP: (103-142)/(51-81) 109/69  Flow (L/min):  [2] 2      Physical Exam:  Constitutional: No acute distress, awake, alert  HENT: NCAT, mucous membranes moist  Respiratory: Clear to auscultation bilaterally, respiratory effort normal   Cardiovascular: RRR, no murmurs, rubs, or gallops  Gastrointestinal: Soft, nontender, nondistended  Musculoskeletal: Muscle tone within normal limits, no joint effusions appreciated  Psychiatric: Appropriate affect, cooperative  Neurologic: Alert and oriented, facial movements symmetric and spontaneous movement of all 4 extremities grossly equal bilaterally, speech clear  Skin: No rashes    Pertinent  and/or Most Recent Results     LAB RESULTS:      Lab 10/26/23  0351 10/25/23  0447 10/24/23  2213 10/24/23  1823   WBC 6.52 6.66  --  7.45   HEMOGLOBIN 15.2 16.0  --  17.8*   HEMATOCRIT 45.1 46.8  --  52.1*   PLATELETS 207 222  --  274   NEUTROS ABS 4.85 4.02  --  4.74   IMMATURE GRANS (ABS) 0.02 0.02  --  0.02   LYMPHS ABS 0.81 1.59  --  1.61   MONOS ABS 0.77 0.84  --  0.87   EOS ABS 0.04 0.14  --  0.16   MCV 87.6 85.4  --  85.8   LACTATE  --   --  1.2  --          Lab 10/26/23  0351 10/25/23  0447 10/24/23  1823   SODIUM 139 137 140   POTASSIUM 4.1 3.8 4.1   CHLORIDE 102 100 100   CO2 26.0 27.0 29.0   ANION GAP 11.0 10.0 11.0   BUN 15 17 16   CREATININE 0.92 1.11 1.30*   EGFR 98.2 78.4 64.9   GLUCOSE 106* 117* 152*   CALCIUM 9.2 9.3 10.1   HEMOGLOBIN A1C  --  7.20*  --          Lab 10/24/23  1823   TOTAL PROTEIN 8.1   ALBUMIN 4.8   GLOBULIN 3.3   ALT (SGPT) 21   AST (SGOT) 17   BILIRUBIN 0.6   ALK PHOS 109                     Brief Urine Lab Results  (Last result in the past 365 days)        Color   Clarity   Blood   Leuk Est   Nitrite   Protein   CREAT   Urine HCG        10/24/23 1817 Yellow   Clear   Negative   Negative   Negative   Trace                 Microbiology Results (last 10 days)       Procedure  Component Value - Date/Time    Wound Culture - Wound, Perirectal Abscess [[id number]] Collected: 10/25/23 1729    Lab Status: Preliminary result Specimen: Wound from Perirectal Abscess Updated: 10/26/23 1011     Wound Culture Growth present, too young to evaluate     Gram Stain Moderate (3+) WBCs seen      Rare (1+) Gram negative bacilli    Blood Culture - Blood, Arm, Left [[id number]]  (Normal) Collected: 10/24/23 2235    Lab Status: Preliminary result Specimen: Blood from Arm, Left Updated: 10/25/23 2300     Blood Culture No growth at 24 hours    Blood Culture - Blood, Arm, Right [[id number]]  (Normal) Collected: 10/24/23 2211    Lab Status: Preliminary result Specimen: Blood from Arm, Right Updated: 10/25/23 2245     Blood Culture No growth at 24 hours            CT Abdomen Pelvis Without Contrast    Result Date: 10/24/2023  CT ABDOMEN PELVIS WO CONTRAST Date of Exam: 10/24/2023 6:10 PM EDT Indication: Swelling around anus, urinary retention, painful BM. Comparison: None available. Technique: Axial CT images were obtained of the abdomen and pelvis without the administration of contrast. Reconstructed coronal and sagittal images were also obtained. Automated exposure control and iterative construction methods were used. Findings: The included lower lungs appear clear. The stomach is fluid distended, otherwise normal in appearance. No mass or inflammation seen of the duodenum. Bowel loops elsewhere are normal in caliber. There is diffuse fatty liver change. There are multiple small gallstones in the dependent portion of the otherwise normal-appearing gallbladder. No biliary ductal dilatation is apparent. Spleen, pancreas, adrenal glands, and kidneys appear within normal limits for non-IV contrast enhanced exam. There is no evidence of intra-abdominal free air or ascites or adenopathy. In the lower abdomen and pelvis, the terminal ileum, cecum and appendix appear within normal limits. No abnormally dilated or inflamed  appearing bowel loops are seen. Bladder is moderately distended and normal in appearance. There is subtle inflammatory change along the anterior margin of the anus from approximately 12:00 to approximately 3:00 as seen on image #156 and adjacent images. A small associated air-fluid level along the anterior margin of the anus may be intraluminal, but may represent a very small intramural abscess, up to 8 mm in diameter. No potential inflammatory focus is identified elsewhere. Bony structures appear to be intact. ,     Impression: 1. Subtle, focal inflammation along the anterior margin of the anus, with either a small amount of intraluminal fluid and air or perhaps an 8 mm anterior intramural abscess. 2. Fluid distended stomach whether from large recent meal or drink or delayed gastric emptying. Please correlate with patient's symptoms. 3. Cholelithiasis. Electronically Signed: Les Mcfarlane MD  10/24/2023 6:34 PM EDT  Workstation ID: [id number]                 Plan for Follow-up of Pending Labs/Results: ngtd  Pending Labs       Order Current Status    Anaerobic Culture - Wound, Perirectal Abscess In process    Blood Culture - Blood, Arm, Left Preliminary result    Blood Culture - Blood, Arm, Right Preliminary result    Wound Culture - Wound, Perirectal Abscess Preliminary result          Discharge Details        Discharge Medications        New Medications        Instructions Start Date   amoxicillin-clavulanate 875-125 MG per tablet  Commonly known as: AUGMENTIN   1 tablet, Oral, 2 Times Daily      diazePAM 5 MG tablet  Commonly known as: Valium   5 mg, Oral, Every 8 Hours PRN      naloxone 4 MG/0.1ML nasal spray  Commonly known as: NARCAN   Call 911. Don't prime. Spray in 1 nostril as needed for overdose. Repeat in 2-3 minutes in other nostril if no or minimal breathing/responsiveness.      oxyCODONE 5 MG immediate release tablet  Commonly known as: Roxicodone   5 mg, Oral, Every 6 Hours PRN             Changes to  Medications        Instructions Start Date   Eliquis 5 MG tablet tablet  Generic drug: apixaban  What changed: additional instructions   5 mg, Oral, 2 Times Daily, Restart 10/28             Continue These Medications        Instructions Start Date   albuterol sulfate  (90 Base) MCG/ACT inhaler  Commonly known as: PROVENTIL HFA;VENTOLIN HFA;PROAIR HFA   1 puff, Inhalation, See Admin Instructions      atorvastatin 80 MG tablet  Commonly known as: LIPITOR   80 mg, Oral, Daily      busPIRone 15 MG tablet  Commonly known as: BUSPAR   15 mg, Oral, 2 Times Daily      empagliflozin 10 MG tablet tablet  Commonly known as: JARDIANCE   10 mg, Oral, Daily      Entresto  MG tablet  Generic drug: sacubitril-valsartan   1 tablet, Oral      ezetimibe 10 MG tablet  Commonly known as: ZETIA   10 mg, Oral, Daily      FLUoxetine 20 MG capsule  Commonly known as: PROzac   20 mg, Oral, Daily      Fluticasone-Salmeterol 250-50 MCG/ACT DISKUS  Commonly known as: ADVAIR/WIXELA   1 puff, Inhalation      metFORMIN  MG 24 hr tablet  Commonly known as: GLUCOPHAGE-XR   750 mg, Oral, Nightly      metoprolol succinate XL 25 MG 24 hr tablet  Commonly known as: TOPROL-XL   25 mg, Oral, Every 24 Hours      pantoprazole 40 MG EC tablet  Commonly known as: PROTONIX   40 mg, Oral, Daily      spironolactone 25 MG tablet  Commonly known as: ALDACTONE   25 mg, Oral, Daily             Stop These Medications      sulfamethoxazole-trimethoprim 800-160 MG per tablet  Commonly known as: BACTRIM DS,SEPTRA DS              No Known Allergies      Discharge Disposition:  Home or Self Care    Diet:  Hospital:No active diet order           Activity:      Restrictions or Other Recommendations:  Per surgeon - written in dc instructions       CODE STATUS:    Code Status and Medical Interventions:   Ordered at: 10/24/23 6423     Code Status (Patient has no pulse and is not breathing):    CPR (Attempt to Resuscitate)     Medical Interventions (Patient  has pulse or is breathing):    Full Support       No future appointments.    Additional Instructions for the Follow-ups that You Need to Schedule       Discharge Follow-up with Specified Provider: Dr Irving 10-14 days   As directed      To: Dr Irving 10-14 days                      Terra Boss MD  10/26/23      Time Spent on Discharge:  I spent  25 minutes on this discharge activity which included: face-to-face encounter with the patient, reviewing the data in the system, coordination of the care with the nursing staff as well as consultants, documentation, and entering orders.

## 2023-10-29 LAB
BACTERIA SPEC AEROBE CULT: ABNORMAL
BACTERIA SPEC AEROBE CULT: NORMAL
BACTERIA SPEC AEROBE CULT: NORMAL
GRAM STN SPEC: ABNORMAL
GRAM STN SPEC: ABNORMAL

## 2023-10-30 LAB — BACTERIA SPEC ANAEROBE CULT: ABNORMAL

## (undated) DEVICE — PK MINOR SPLT 10

## (undated) DEVICE — CATH IV INSYTE AUTOGARD 14G 1 1/2IN ORNG

## (undated) DEVICE — STRAP STIRUP WO/RNG 19X3.5IN DISP

## (undated) DEVICE — DRSNG PAD ABD 8X10IN STRL

## (undated) DEVICE — GLV SURG SENSICARE PI MIC PF SZ7.5 LF STRL

## (undated) DEVICE — SUT GUT CHRM 3/0 RB1 27IN U204H

## (undated) DEVICE — SUT SILK 0 TIES 30IN A306H

## (undated) DEVICE — SYR CONTRL PRESS/LO FIX/M/LL W/THMB/RNG 10ML

## (undated) DEVICE — PREMIUM DRY TRAY LF: Brand: MEDLINE INDUSTRIES, INC.

## (undated) DEVICE — DRAPE,UNDERBUTTOCKS,STERILE: Brand: MEDLINE

## (undated) DEVICE — CVR HNDL LIGHT RIGID

## (undated) DEVICE — NEEDLE,HYPODERM,SAFETY, 22GX1.5: Brand: MEDLINE

## (undated) DEVICE — TBG PENCL TELESCP MEGADYNE SMOKE EVAC 10FT

## (undated) DEVICE — TRAP FLD MINIVAC MEGADYNE 100ML

## (undated) DEVICE — TP SILK DURAPORE 3IN

## (undated) DEVICE — JELLY,LUBE,STERILE,FLIP TOP,TUBE,2-OZ: Brand: MEDLINE

## (undated) DEVICE — SPNG GZ WOVN 4X4IN 12PLY 10/BX STRL

## (undated) DEVICE — INTENDED TO BE USED TO OCCLUDE, RETRACT AND IDENTIFY ARTERIES, VEINS, TENDONS AND NERVES IN SURGICAL PROCEDURES: Brand: STERION®  VESSEL LOOP

## (undated) DEVICE — GOWN,REINFORCE,POLY,SIRUS,BREATH SLV,XLG: Brand: MEDLINE